# Patient Record
Sex: MALE | Race: WHITE | NOT HISPANIC OR LATINO | Employment: UNEMPLOYED | URBAN - METROPOLITAN AREA
[De-identification: names, ages, dates, MRNs, and addresses within clinical notes are randomized per-mention and may not be internally consistent; named-entity substitution may affect disease eponyms.]

---

## 2017-01-12 DIAGNOSIS — I49.5 SICK SINUS SYNDROME (HCC): ICD-10-CM

## 2017-01-12 DIAGNOSIS — I48.91 ATRIAL FIBRILLATION (HCC): ICD-10-CM

## 2017-01-21 ENCOUNTER — LAB CONVERSION - ENCOUNTER (OUTPATIENT)
Dept: OTHER | Facility: OTHER | Age: 52
End: 2017-01-21

## 2017-01-21 ENCOUNTER — GENERIC CONVERSION - ENCOUNTER (OUTPATIENT)
Dept: OTHER | Facility: OTHER | Age: 52
End: 2017-01-21

## 2017-01-21 LAB
A/G RATIO (HISTORICAL): 1.7 (CALC) (ref 1–2.5)
ALBUMIN SERPL BCP-MCNC: 4.5 G/DL (ref 3.6–5.1)
ALP SERPL-CCNC: 71 U/L (ref 40–115)
ALT SERPL W P-5'-P-CCNC: 26 U/L (ref 9–46)
AST SERPL W P-5'-P-CCNC: 24 U/L (ref 10–35)
BILIRUB SERPL-MCNC: 1.1 MG/DL (ref 0.2–1.2)
BUN SERPL-MCNC: 34 MG/DL (ref 7–25)
BUN/CREA RATIO (HISTORICAL): 19 (CALC) (ref 6–22)
CALCIUM SERPL-MCNC: 9.5 MG/DL (ref 8.6–10.3)
CHLORIDE SERPL-SCNC: 105 MMOL/L (ref 98–110)
CO2 SERPL-SCNC: 23 MMOL/L (ref 20–31)
CREAT SERPL-MCNC: 1.76 MG/DL (ref 0.7–1.33)
DIGOXIN LEVEL (HISTORICAL): 0.6 MCG/L (ref 0.8–2)
EGFR AFRICAN AMERICAN (HISTORICAL): 51 ML/MIN/1.73M2
EGFR-AMERICAN CALC (HISTORICAL): 44 ML/MIN/1.73M2
GAMMA GLOBULIN (HISTORICAL): 2.7 G/DL (CALC) (ref 1.9–3.7)
GLUCOSE (HISTORICAL): 72 MG/DL (ref 65–99)
POTASSIUM SERPL-SCNC: 4.8 MMOL/L (ref 3.5–5.3)
SODIUM SERPL-SCNC: 136 MMOL/L (ref 135–146)
TOTAL PROTEIN (HISTORICAL): 7.2 G/DL (ref 6.1–8.1)

## 2017-03-31 ENCOUNTER — GENERIC CONVERSION - ENCOUNTER (OUTPATIENT)
Dept: OTHER | Facility: OTHER | Age: 52
End: 2017-03-31

## 2017-05-22 ENCOUNTER — ALLSCRIPTS OFFICE VISIT (OUTPATIENT)
Dept: OTHER | Facility: OTHER | Age: 52
End: 2017-05-22

## 2017-05-22 DIAGNOSIS — I77.810 THORACIC AORTIC ECTASIA (HCC): ICD-10-CM

## 2017-05-31 ENCOUNTER — HOSPITAL ENCOUNTER (OUTPATIENT)
Dept: CT IMAGING | Facility: CLINIC | Age: 52
Discharge: HOME | End: 2017-05-31
Payer: MEDICARE

## 2017-05-31 DIAGNOSIS — I77.810 THORACIC AORTIC ECTASIA (HCC): ICD-10-CM

## 2017-05-31 PROCEDURE — 74150 CT ABDOMEN W/O CONTRAST: CPT

## 2017-05-31 PROCEDURE — 71250 CT THORAX DX C-: CPT

## 2017-06-01 ENCOUNTER — GENERIC CONVERSION - ENCOUNTER (OUTPATIENT)
Dept: OTHER | Facility: OTHER | Age: 52
End: 2017-06-01

## 2017-11-24 ENCOUNTER — ALLSCRIPTS OFFICE VISIT (OUTPATIENT)
Dept: OTHER | Facility: OTHER | Age: 52
End: 2017-11-24

## 2017-11-25 NOTE — PROGRESS NOTES
Assessment  Assessed    1  Atrial fibrillation (427 31) (I48 91)   2  Anticoagulant long-term use (V58 61) (Z79 01)   3  Dilation of thoracic aorta (447 71) (I77 810)   4  Sinoatrial node dysfunction (427 81) (I49 5)    Plan  Atrial fibrillation    · Digoxin 125 MCG Oral Tablet; take 1 tablet by mouth once daily   Rx By: Karyn Sanchez; Dispense: 90 Days ; #:90 Tablet; Refill: 3;Atrial fibrillation; ARLEY = N; Verified Transmission to [address]; Last Updated By: System, [employer]; 11/24/2017 10:19:32 AM   · Follow-up visit in 6 months Evaluation and Treatment  Follow-up  Status: Hold For -Scheduling  Requested for: [id number]   Ordered; For: Atrial fibrillation; Ordered By: Karyn Sanchez Performed:  Due: 96OFR2181; Last Updated By: Fay Ruth; 11/24/2017 10:21:21 AM  Hypercholesterolemia    · Simvastatin 20 MG Oral Tablet; take 1 tablet by mouth once daily   Rx By: Karyn Sanchez; Dispense: 30 Days ; #:90 Tablet; Refill: 5;Hypercholesterolemia; ARLEY = N; Verified Transmission to [address]; Last Updated By: System, [employer]; 11/24/2017 10:19:32 AM  Hypertension    · Metoprolol Succinate ER 50 MG Oral Tablet Extended Release 24 Hour; TAKE 11/2 TABLETS TWICE DAILY   Rx By: Karyn Sanchez; Dispense: 90 Days ; #:270 Tablet Extended Release 24 Hour; Refill: 3;Hypertension; ARLEY = N; Verified Transmission to [address]; Last Updated By: System, [employer]Script[employer]; 11/24/2017 10:19:32 AM    Discussion/Summary  Cardiology Discussion Summary Free Text Note Form [facility]:   Patient with multiple medical problems who seems to be doing reasonably well from cardiac standpoint  Previous studies reviewed with patient  Medications reviewed and possible side effects discussed  concepts of cardiovascular disease , signs and symptoms of heart disease  Dietary and risk factor modification reinforced  All questions answered  Safety measures reviewed   Patient advised to report any problems prompting medical attention  understands the risks and benefits of anticoagulation to prevent thromboembolic risk from atrial fibrillation  Patient will continue to follow up with the pacemaker clinic  Importance of salt restriction reinforced  Symptoms to watch out from cardiac standpoint which would indicate the need for further cardiac evaluation also discussed with patient and family  Follow-up in 6 months  Follow-up with primary care physician and Psychiatry  Goals and Barriers: The patient has the current Goals: Report any bleedingpacemaker clinic  The patent has the current Barriers: None  Patient's Capacity to Self-Care: Patient is able to Self-Care  Patient Education: Educational resources provided: See [facility]    Medication SE Review and Pt Understands Tx: Possible side effects of new medications were reviewed with the patient/guardian today  The treatment plan was reviewed with the patient/guardian  The patient/guardian understands and agrees with the treatment plan   Counseling Documentation With Imm: The patient, patient's family was counseled regarding diagnostic results,-- instructions for management,-- risk factor reductions,-- prognosis,-- patient and family education,-- risks and benefits of treatment options,-- importance of compliance with treatment  Chief Complaint  Chief Complaint Free Text Note Form: followup visit   Chief Complaint Chronic Condition [facility]: Patient is here today for follow up of chronic conditions described in HPI  History of Present Illness  Cardiology [facility] Free Text Note Form [facility]: Patient presents for follow-up visit with history of paroxysmal atrial fibrillation on chronic Eliquis, sinoatrial node dysfunction with history of pacemaker, hypertension, history of dilated thoracic aorta  Patient states he is feeling well denies any chest pain, chest pressure, chest heaviness, shortness of breath  He denies any dizziness, syncope, presyncope   He denies any bleeding troubles on Eliquis  Patient states he is taking all medications and does need refills  Review of Systems  Cardiology Male ROS:    Cardiac: No complaints of chest pain, no palpitations, no fainiting -- and-- as noted in HPI  Skin: No complaints of nonhealing sores or skin rash  Genitourinary: No complaints of recurrent urinary tract infections, frequent urination at night, difficult urination, blood in urine, kidney stones, loss of bladder control, no kidney or prostate problems, no erectile dysfunction  Psychological: No complaints of feeling depressed, anxiety, panic attacks, or difficulty concentrating  General: No complaints of trouble sleeping, lack of energy, fatigue, appetite changes, weight changes, fever, frequent infections, or night sweats  Respiratory: No complaints of shortness of breath, cough with sputum, or wheezing  HEENT: No complaints of serious problems, hearing problems, nose problems, throat problems, or snoring  Gastrointestinal: No complaints of liver problems, nausea, vomiting, heartburn, constipation, bloody stools, diarrhea, problems swallowing, adbominal pain, or rectal bleeding  Hematologic: No complaints of bleeding disorders, anemia, blood clots, or excessive brusing  Neurological: No complaints of numbness, tingling, dizziness, weakness, seizures, headaches, syncope or fainting, AM fatigue, daytime sleepiness, no witnessed apnea episodes  Musculoskeletal: No complaints of arthritis, back pain, or painfull swelling  ROS Reviewed:   ROS reviewed  Active Problems  Problems    1  Anticoagulant long-term use (V58 61) (Z79 01)   2  Atrial fibrillation (427 31) (I48 91)   3  Chest discomfort (786 59) (R07 89)   4  Depression (311) (F32 9)   5  Dilation of thoracic aorta (447 71) (I77 810)   6  History of cardiac pacemaker (V12 50,V45 01) (Z95 0)   7  Hypercholesterolemia (272 0) (E78 00)   8  Hypertension (401 9) (I10)   9   Palpitations (785 1) (R00 2)   10  Paroxysmal atrial fibrillation (427 31) (I48 0)   11  Sinoatrial node dysfunction (427 81) (I49 5)    Past Medical History  Problems    1  History of Abnormal electrocardiogram (794 31) (R94 31)   2  History of Acute Myocardial Infarction (V12 59)   3  History of Benign essential hypertension (401 1) (I10)   4  History of Cardiac arrhythmia (427 9) (I49 9)   5  History of Coronary atherosclerosis of native coronary artery (414 01) (I25 10)   6  History of Difficulty breathing (786 09) (R06 89)   7  History of Dissection of aorta (441 00) (I71 00)   8  History of abdominal aortic aneurysm (V12 59) (Z86 79)   9  History of aortic aneurysm (V12 59) (Z86 79)   10  History of atrial fibrillation (V12 59) (Z86 79)   11  History of cardiac pacemaker (V12 50,V45 01) (Z95 0)   12  History of chest pain (V13 89) (Z87 898)   13  History of hypercholesterolemia (V12 29) (Z86 39)   14  History of hyperlipidemia (V12 29) (Z86 39)   15  History of hypertension (V12 59) (Z86 79)   16  History of precordial chest pain (V13 89) (Z87 898)   17  History of shortness of breath (V13 89) (Z87 898)   18  History of sinoatrial node dysfunction (V12 59) (Z86 79)   19  History of Limb pain (729 5) (M79 609)   20  Old myocardial infarction (412) (I25 2)   21  History of Tricuspid valve disorders, non-rheumatic (424 2) (I36 9)  Active Problems And Past Medical History Reviewed: The active problems and past medical history were reviewed and updated today  Surgical History  Problems    1  History of Aortic Dissection Repair   2  History of Arterial Catheterization   3  History of Pacemaker Placement  Surgical History Reviewed: The surgical history was reviewed and updated today  Family History  Family History    1  No pertinent family history  Family History Reviewed: The family history was reviewed and updated today         Social History  Problems    · Denied: History of Alcohol Use (History)   · Never A Smoker  Social History Reviewed: The social history was reviewed and updated today  The social history was reviewed and is unchanged  Current Meds   1  AmLODIPine Besylate 5 MG Oral Tablet; TAKE 1 TABLET DAILY FOR BLOOD PRESSURE  Requested for: 72Qjx5818; Last Rx:44Rjp5691 Ordered   2  Digitek 125 MCG Oral Tablet; take 1 tablet by mouth once daily; Therapy: 86Dzx5035 to (Evaluate:17May2018)  Requested for: 92ZAB6056; Last Rx:22May2017 Ordered   3  Divalproex Sodium 500 MG Oral Tablet Delayed Release; Therapy: 79MYP5255 to (Evaluate:09Sep2014) Recorded   4  Eliquis 5 MG Oral Tablet; Take 1 tablet twice daily; Therapy: 45WRQ0965 to (Evaluate:17May2018)  Requested for: 47JYJ2382; Last Rx:22May2017 Ordered   5  FLUoxetine HCl - 10 MG Oral Tablet; TAKE 1 TABLET DAILY AS DIRECTED; Therapy: 65FFJ1131 to Recorded   6  Fluticasone Propionate 50 MCG/ACT Nasal Suspension; Therapy: 03UAG8829 to (Evaluate:19Sep2014) Recorded   7  HydrOXYzine HCl - 50 MG Oral Tablet; TAKE 1 TABLET AT BEDTIME; Therapy: 34FJC1991 to Recorded   8  Lisinopril 5 MG Oral Tablet; Take 1 tablet daily Recorded   9  Metoprolol Succinate ER 50 MG Oral Tablet Extended Release 24 Hour; TAKE 1 1/2 TABLETS TWICE DAILY; Therapy: 86ZRY8984 to (Evaluate:17May2018)  Requested for: 05WMC2806; Last Rx:22May2017 Ordered   10  PriLOSEC 20 MG CPDR; TAKE 1 CAPSULE DAILY; Therapy: (Recorded:78Lit4402) to Recorded   11  Rozerem 8 MG Oral Tablet; TAKE 1 TABLET AT BEDTIME; Therapy: 59YBZ0738 to Recorded   12  Simvastatin 20 MG Oral Tablet; take 1 tablet by mouth once daily; Therapy: 66XLC7256 to (Evaluate:10May2018)  Requested for: 42UKN8803; Last  Rx:11Nov2017 Ordered   13  Uloric 40 MG Oral Tablet; TAKE 1 TABLET DAILY; Therapy: (Recorded:00Omb7995) to Recorded   14  Ziprasidone HCl - 40 MG Oral Capsule; 1 CAPSULE DAILY; Therapy: 29XZL3113 to Recorded  Medication List Reviewed: The medication list was reviewed and updated today  Allergies  Medication    1  No Known Drug Allergies    Vitals  Vital Signs    Recorded: 54SHY7289 09:56AM   Heart Rate 60   Systolic 159   Diastolic 84   Height 5 ft 10 in   Weight 224 lb 2 0 oz   BMI Calculated 32 16   BSA Calculated 2 19   O2 Saturation 97       Physical Exam   Constitutional  General appearance: No acute distress, well appearing and well nourished  Eyes  Conjunctiva and Sclera examination: Conjunctiva pink, sclera anicteric  Ears, Nose, Mouth, and Throat - Oropharynx: Clear, nares are clear, mucous membranes are moist   Neck  Neck and thyroid: Normal, supple, trachea midline, no thyromegaly  Pulmonary  Respiratory effort: No increased work of breathing or signs of respiratory distress  Auscultation of lungs: Clear to auscultation, no rales, no rhonchi, no wheezing, good air movement  Cardiovascular  Auscultation of heart: Normal rate and rhythm, normal S1 and S2, no murmurs  Carotid pulses: Normal, 2+ bilaterally  Peripheral vascular exam: Normal pulses throughout, no tenderness, erythema or swelling  Pedal pulses: Normal, 2+ bilaterally  Examination of extremities for edema and/or varicosities: Normal    Abdomen  Abdomen: Non-tender and no distention  Liver and spleen: No hepatomegaly or splenomegaly  Musculoskeletal Gait and station: Normal gait  -- Digits and nails: Normal without clubbing or cyanosis  -- Inspection/palpation of joints, bones, and muscles: Normal, ROM normal    Skin - Skin and subcutaneous tissue: Normal without rashes or lesions  Skin is warm and well perfused, normal turgor  Neurologic - Cranial nerves: II - XII intact  -- Speech: Normal    Psychiatric - Orientation to person, place, and time: Normal -- Mood and affect: Normal       Results/Data  Diagnostic Studies Reviewed Cardio: I personally reviewed the recording/images in the office today  My interpretation follows  ICD/ Pacer Interpretation Pacemaker interrogation data reviewed  Normally functioning pacemaker   Normal lead impedance  Battery status is good  No episodes of atrial fibrillation noted  Reviewed with patient and family  Attending Note  Collaborating Physician Note: Collaborating Physician: I interviewed and examined the patient,-- I supervised the Advanced Practitioner-- and-- I agree with the Advanced Practitioner note        Future Appointments    Date/Time Provider Specialty Site   02/23/2018 09:20 AM Cardiology, Pacemaker Clin [facility]   [address]       Signatures   Electronically signed by : Florrie Dancer, [facility]; Nov 24 2017 10:21AM EST                       (Author)    Electronically signed by : FORTINO Mario ; Nov 24 2017  2:53PM EST                       (Author)

## 2018-01-11 NOTE — RESULT NOTES
Verified Results  (1) COMPREHENSIVE METABOLIC PANEL 07XPG6976 54:07GE Candace Good     Test Name Result Flag Reference   GLUCOSE 72 mg/dL  65-99   Fasting reference interval   UREA NITROGEN (BUN) 34 mg/dL H 7-25   CREATININE 1 76 mg/dL H 0 70-1 33   For patients >52years of age, the reference limit  for Creatinine is approximately 13% higher for people  identified as -American  eGFR NON-AFR  AMERICAN 44 mL/min/1 73m2 L > OR = 60   eGFR AFRICAN AMERICAN 51 mL/min/1 73m2 L > OR = 60   BUN/CREATININE RATIO 19 (calc)  6-22   SODIUM 136 mmol/L  135-146   POTASSIUM 4 8 mmol/L  3 5-5 3   CHLORIDE 105 mmol/L     CARBON DIOXIDE 23 mmol/L  20-31   CALCIUM 9 5 mg/dL  8 6-10 3   PROTEIN, TOTAL 7 2 g/dL  6 1-8 1   ALBUMIN 4 5 g/dL  3 6-5 1   GLOBULIN 2 7 g/dL (calc)  1 9-3 7   ALBUMIN/GLOBULIN RATIO 1 7 (calc)  1 0-2 5   BILIRUBIN, TOTAL 1 1 mg/dL  0 2-1 2   ALKALINE PHOSPHATASE 71 U/L     AST 24 U/L  10-35   ALT 26 U/L  9-46     (1) DIGOXIN 28KZO2961 10:24AM Candace Good   REPORT COMMENT:  FASTING:YES     Test Name Result Flag Reference   DIGOXIN 0 6 mcg/L L 0 8-2 0   NEL Anti-Digoxin (Digibind(R)) in serum/plasma of   patients under toxicity therapy may interfere  with the digoxin immunoassay

## 2018-01-12 VITALS
HEART RATE: 60 BPM | BODY MASS INDEX: 32.09 KG/M2 | DIASTOLIC BLOOD PRESSURE: 84 MMHG | OXYGEN SATURATION: 97 % | SYSTOLIC BLOOD PRESSURE: 120 MMHG | HEIGHT: 70 IN | WEIGHT: 224.13 LBS

## 2018-01-14 VITALS
WEIGHT: 224.38 LBS | HEART RATE: 60 BPM | HEIGHT: 70 IN | OXYGEN SATURATION: 97 % | SYSTOLIC BLOOD PRESSURE: 136 MMHG | BODY MASS INDEX: 32.12 KG/M2 | DIASTOLIC BLOOD PRESSURE: 84 MMHG

## 2018-01-17 NOTE — RESULT NOTES
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  COMPARISON: Report of outside CT scan dated 5/5/2017     TECHNIQUE: CT examination of the chest and abdomen was performed without intravenous contrast  Reformatted images were created in axial, sagittal, and coronal planes  Radiation dose length product (DLP) for this visit: 1428 mGy-cm   This examination, like all CT scans performed in the [facility], was performed utilizing techniques to minimize radiation dose exposure, including the use of iterative    reconstruction and automated exposure control  Enteric contrast was not administered  FINDINGS:     CHEST     LUNGS: There is a calcified granuloma in the right lung on image 31  There is some minimal scarring anteriorly in the left upper lobe in the subpleural location  No suspicious lung findings  Airways are clear  PLEURA: No pleural effusion  VESSELS: 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  There is aneurysmal dilatation of the aortic arch with a bulging contour noted along its medial border directed towards the trachea and esophagus  The maximum diameter of the vessel in    this region is 4 1 cm  There is minimal calcification along the surface of this almost saccular type aneurysm  There are surgical clips seen adjacent to the aortic wall just distal to the aneurysm  This presumably indicates a site of prior vascular    repair  The remainder of the aorta is normal in caliber  The lack of IV contrast precludes assessment for dissection   The branch vessels from the arch appear normal in caliber  HEART: Heart is normal size  Dual lead transvenous pacemaker is present  There is no pericardial effusion  MEDIASTINUM AND CHRISTOPHER: No pathologic adenopathy identified  Normal appearing small mediastinal lymph nodes are present  Again, there may be a very small sliding hiatal hernia  BODY WALL AND LOWER NECK: There is subtle gynecomastia  There is no axillary adenopathy  Base of the neck is unremarkable  ABDOMEN     LIVER/BILIARY TREE: Unremarkable  GALLBLADDER: No calcified gallstones  No pericholecystic inflammatory change  SPLEEN: Unremarkable  PANCREAS: Unremarkable  ADRENAL GLANDS: Unremarkable  KIDNEYS/URETERS: There is a cystic-appearing lesion medially at the upper pole of the right kidney which measures 2 8 x 2 6 cm  The remainder of the right kidney is unremarkable  There is a cystic-appearing lesion anteriorly at the midpole of the left   kidney which measures 2 2 cm  There is no hydronephrosis or nephrolithiasis on either side  No perirenal fluid  Visualized ureters are unremarkable  VISUALIZED BOWEL: There is a moderate size duodenal diverticulum adjacent to the pancreatic head  No acute bowel pathology seen within the field-of-view  VISUALIZED ABDOMINAL CAVITY: No ascites or free air  No fluid collection  OSSEOUS STRUCTURES: No acute findings  Deformity of the posterior aspect of the left 4th rib is probably postsurgical        IMPRESSION:     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

## 2018-01-17 NOTE — MISCELLANEOUS
This patient is followed by me at from a cardiac standpoint  Based on his medical conditions and comorbidities, it is in the best interest of this patient not to serve on jury duty  If you have any specific questions,  please do not hesitate to contact me in person  Electronically signed by:Candace PIKE    Mar 31 2017 11:30PM EST

## 2018-02-13 ENCOUNTER — TELEPHONE (OUTPATIENT)
Dept: CARDIOLOGY CLINIC | Facility: CLINIC | Age: 53
End: 2018-02-13

## 2018-03-09 ENCOUNTER — OFFICE VISIT (OUTPATIENT)
Dept: CARDIOLOGY CLINIC | Facility: CLINIC | Age: 53
End: 2018-03-09
Payer: MEDICARE

## 2018-03-09 DIAGNOSIS — I49.5 SINOATRIAL NODE DYSFUNCTION (HCC): ICD-10-CM

## 2018-03-09 DIAGNOSIS — Z95.0 CARDIAC PACEMAKER IN SITU: Primary | ICD-10-CM

## 2018-03-09 PROCEDURE — 93280 PM DEVICE PROGR EVAL DUAL: CPT | Performed by: INTERNAL MEDICINE

## 2018-03-09 NOTE — PROGRESS NOTES
Device in Person    [employer] Shay's device    Normal pacemaker function  Normal lead impedance  Battery status is good    P waves -  5 0  mV  R-waves  - 8 7  mV    Capture thresholds    Atrial lead -    0 62  V@    0 4ms    Ventricular lead -    0 87 V@    0 4 ms    Atrial lead impedance -       440Ohms     ventricular lead impedance -     410 Ohms

## 2018-03-29 RX ORDER — HYDROXYZINE PAMOATE 50 MG/1
CAPSULE ORAL
Refills: 0 | COMMUNITY
Start: 2018-02-25 | End: 2018-04-11

## 2018-03-29 RX ORDER — DIVALPROEX SODIUM 500 MG/1
TABLET, DELAYED RELEASE ORAL
COMMUNITY
End: 2022-02-16 | Stop reason: ALTCHOICE

## 2018-03-29 RX ORDER — FEBUXOSTAT 40 MG/1
40 TABLET ORAL DAILY
Refills: 0 | COMMUNITY
Start: 2018-01-22

## 2018-03-29 RX ORDER — HYDROXYZINE 50 MG/1
1 TABLET, FILM COATED ORAL
COMMUNITY

## 2018-03-29 RX ORDER — ZIPRASIDONE HYDROCHLORIDE 40 MG/1
40 CAPSULE ORAL
Refills: 0 | COMMUNITY
Start: 2018-02-28

## 2018-03-29 RX ORDER — FLUTICASONE PROPIONATE 50 MCG
SPRAY, SUSPENSION (ML) NASAL
COMMUNITY
Start: 2014-03-28 | End: 2022-02-16 | Stop reason: ALTCHOICE

## 2018-03-29 RX ORDER — OMEPRAZOLE 20 MG/1
20 CAPSULE, DELAYED RELEASE ORAL DAILY
Refills: 0 | COMMUNITY
Start: 2018-02-24

## 2018-03-29 RX ORDER — DIGOXIN 125 MCG
1 TABLET ORAL
COMMUNITY
Start: 2017-11-24 | End: 2018-04-11 | Stop reason: SDUPTHER

## 2018-03-29 RX ORDER — OMEPRAZOLE 20 MG/1
1 CAPSULE, DELAYED RELEASE ORAL DAILY
COMMUNITY
End: 2018-04-11

## 2018-03-29 RX ORDER — FLUOXETINE 10 MG/1
10 CAPSULE ORAL DAILY
Refills: 0 | COMMUNITY
Start: 2018-02-27

## 2018-03-29 RX ORDER — AMLODIPINE BESYLATE 5 MG/1
1 TABLET ORAL
COMMUNITY
End: 2018-04-11 | Stop reason: SDUPTHER

## 2018-03-29 RX ORDER — RAMELTEON 8 MG
8 TABLET ORAL
Refills: 0 | COMMUNITY
Start: 2018-03-01

## 2018-03-29 RX ORDER — LISINOPRIL 5 MG/1
1 TABLET ORAL
COMMUNITY
Start: 2017-11-10 | End: 2018-04-11 | Stop reason: SDUPTHER

## 2018-03-29 RX ORDER — SIMVASTATIN 20 MG
TABLET ORAL
Refills: 0 | COMMUNITY
Start: 2018-03-12 | End: 2018-04-11 | Stop reason: SDUPTHER

## 2018-03-29 RX ORDER — APIXABAN 5 MG/1
5 TABLET, FILM COATED ORAL 2 TIMES DAILY
Refills: 0 | COMMUNITY
Start: 2018-02-26 | End: 2018-04-11 | Stop reason: SDUPTHER

## 2018-03-29 RX ORDER — METOPROLOL SUCCINATE 50 MG/1
1.5 TABLET, EXTENDED RELEASE ORAL
COMMUNITY
Start: 2017-11-24 | End: 2018-04-11 | Stop reason: SDUPTHER

## 2018-04-11 ENCOUNTER — OFFICE VISIT (OUTPATIENT)
Dept: CARDIOLOGY CLINIC | Facility: CLINIC | Age: 53
End: 2018-04-11
Payer: MEDICARE

## 2018-04-11 VITALS
HEIGHT: 70 IN | DIASTOLIC BLOOD PRESSURE: 88 MMHG | WEIGHT: 221 LBS | HEART RATE: 60 BPM | BODY MASS INDEX: 31.64 KG/M2 | SYSTOLIC BLOOD PRESSURE: 138 MMHG | OXYGEN SATURATION: 98 %

## 2018-04-11 DIAGNOSIS — I48.0 PAROXYSMAL A-FIB (HCC): ICD-10-CM

## 2018-04-11 DIAGNOSIS — R06.00 DYSPNEA ON EFFORT: ICD-10-CM

## 2018-04-11 DIAGNOSIS — Z79.01 CHRONIC ANTICOAGULATION: ICD-10-CM

## 2018-04-11 DIAGNOSIS — E78.2 COMBINED HYPERLIPIDEMIA: ICD-10-CM

## 2018-04-11 DIAGNOSIS — I10 HYPERTENSION, ESSENTIAL: ICD-10-CM

## 2018-04-11 DIAGNOSIS — I10 HYPERTENSION, ESSENTIAL: Primary | ICD-10-CM

## 2018-04-11 DIAGNOSIS — I48.0 PAROXYSMAL ATRIAL FIBRILLATION (HCC): Primary | ICD-10-CM

## 2018-04-11 DIAGNOSIS — R07.89 CHEST DISCOMFORT: ICD-10-CM

## 2018-04-11 PROBLEM — R06.09 DYSPNEA ON EFFORT: Status: ACTIVE | Noted: 2018-04-11

## 2018-04-11 PROCEDURE — 99214 OFFICE O/P EST MOD 30 MIN: CPT | Performed by: INTERNAL MEDICINE

## 2018-04-11 RX ORDER — DIGOXIN 125 MCG
125 TABLET ORAL DAILY
Qty: 90 TABLET | Refills: 3 | Status: SHIPPED | OUTPATIENT
Start: 2018-04-11 | End: 2018-10-31 | Stop reason: SDUPTHER

## 2018-04-11 RX ORDER — LISINOPRIL 5 MG/1
5 TABLET ORAL DAILY
Qty: 90 TABLET | Refills: 3 | Status: SHIPPED | OUTPATIENT
Start: 2018-04-11 | End: 2018-10-31 | Stop reason: SDUPTHER

## 2018-04-11 RX ORDER — SIMVASTATIN 20 MG
20 TABLET ORAL
Qty: 90 TABLET | Refills: 3 | Status: SHIPPED | OUTPATIENT
Start: 2018-04-11 | End: 2018-10-31 | Stop reason: SDUPTHER

## 2018-04-11 RX ORDER — AMLODIPINE BESYLATE 5 MG/1
5 TABLET ORAL DAILY
Qty: 90 TABLET | Refills: 3 | Status: SHIPPED | OUTPATIENT
Start: 2018-04-11 | End: 2018-10-31 | Stop reason: SDUPTHER

## 2018-04-11 RX ORDER — METOPROLOL SUCCINATE 50 MG/1
75 TABLET, EXTENDED RELEASE ORAL DAILY
Qty: 135 TABLET | Refills: 3 | Status: SHIPPED | OUTPATIENT
Start: 2018-04-11 | End: 2018-10-31 | Stop reason: SDUPTHER

## 2018-04-11 RX ORDER — APIXABAN 5 MG/1
5 TABLET, FILM COATED ORAL 2 TIMES DAILY
Qty: 180 TABLET | Refills: 3 | Status: SHIPPED | OUTPATIENT
Start: 2018-04-11 | End: 2018-10-31 | Stop reason: SDUPTHER

## 2018-04-11 NOTE — PROGRESS NOTES
[facility] [place] [facility]  [address] [place] [address] [address] [id number]  Cardiology Follow Up    Nicole Mariella Learn  1965  [id number]      1  Paroxysmal atrial fibrillation (HCC)     2  Hypertension, essential     3  Chronic anticoagulation     4  Chest discomfort  NM myocardial perfusion spect (stress and/or rest)   5  Dyspnea on effort  NM myocardial perfusion spect (stress and/or rest)       Chief Complaint   Patient presents with    Follow-up     afib       Interval History: For follow-up visit  Patient has been having intermittent episodes of chest discomfort some of them related to activity   Patient does not have any history of documented coronary artery disease or MI in the past   Patient does have history of hypertension hyperlipidemia history of surgery for aortic dissection many years ago  Patient also has history of sick sinus syndrome status post pacemaker  Patient also has history of paroxysmal atrial fibrillation and is on anticoagulation  Patient does have some shortness of breath with exertion  No recent cardiac workup  Patient Active Problem List   Diagnosis    Paroxysmal atrial fibrillation (HCC)    Hypertension, essential    Chronic anticoagulation    Chest discomfort    Dyspnea on effort     Past Medical History:   Diagnosis Date    Abdominal aortic aneurysm (AAA) (HCC)     Abnormal ECG     Atrial fibrillation (HCC)     Cardiac arrhythmia     Chest pain     Coronary atherosclerosis of native coronary artery     Hyperlipidemia     Hypertension     Myocardial infarction ([address] )     Sinoatrial node dysfunction (HCC)     SOB (shortness of breath)      Social History     Social History    Marital status: Single     Spouse name: N/A    Number of children: N/A    Years of education: N/A     Occupational History    Not on file       Social History Main Topics    Smoking status: Never Smoker    Smokeless tobacco: Never Used    Alcohol use No    Drug use: Unknown  Sexual activity: Not on file     Other Topics Concern    Not on file     Social History Narrative    No narrative on file      No family history on file  Past Surgical History:   Procedure Laterality Date    CARDIAC CATHETERIZATION      INSERT / REPLACE / REMOVE PACEMAKER      PELVIC AND PARA-AORTIC LYMPH NODE DISSECTION         Current Outpatient Prescriptions:     Cyclobenzaprine HCl (FLEXERIL PO), 1 tablet at bedtime, Disp: , Rfl:     FLUoxetine (PROzac) 10 mg capsule, , Disp: , Rfl: 0    fluticasone (FLONASE) 50 mcg/act nasal spray, into each nostril, Disp: , Rfl:     hydrOXYzine HCL (ATARAX) 50 mg tablet, Take 1 tablet by mouth, Disp: , Rfl:     omeprazole (PriLOSEC) 20 mg delayed release capsule, Take 20 mg by mouth daily, Disp: , Rfl: 0    ROZEREM 8 MG tablet, , Disp: , Rfl: 0    ULORIC 40 MG tablet, Take 40 mg by mouth daily, Disp: , Rfl: 0    ziprasidone (GEODON) 40 mg capsule, , Disp: , Rfl: 0    amLODIPine (NORVASC) 5 mg tablet, Take 1 tablet (5 mg total) by mouth daily, Disp: 90 tablet, Rfl: 3    digoxin (DIGITEK) 0 125 mg tablet, Take 1 tablet (125 mcg total) by mouth daily, Disp: 90 tablet, Rfl: 3    divalproex sodium (DEPAKOTE) 500 mg EC tablet, 1 TABLET DAILY, Disp: , Rfl:     ELIQUIS 5 MG, Take 1 tablet (5 mg total) by mouth 2 (two) times a day, Disp: 180 tablet, Rfl: 3    lisinopril (ZESTRIL) 5 mg tablet, Take 1 tablet (5 mg total) by mouth daily, Disp: 90 tablet, Rfl: 3    metoprolol succinate (TOPROL-XL) 50 mg 24 hr tablet, Take 1 5 tablets (75 mg total) by mouth daily, Disp: 135 tablet, Rfl: 3    simvastatin (ZOCOR) 20 mg tablet, Take 1 tablet (20 mg total) by mouth daily at bedtime, Disp: 90 tablet, Rfl: 3  No Known Allergies    Labs:  No visits with results within 2 Month(s) from this visit     Latest known visit with results is:   Lab Conversion - Encounter on 01/21/2017   Component Date Value    Glucose 01/20/2017 72     BUN 01/20/2017 34*    Creatinine 01/20/2017 1 76*    EGFR-AMERICAN CALC 01/20/2017 44*    eGFR  01/20/2017 51*    BUN/CREA Ratio 01/20/2017 19     Sodium 01/20/2017 136     Potassium 01/20/2017 4 8     Chloride 01/20/2017 105     CO2 01/20/2017 23     Calcium 01/20/2017 9 5     Total Protein 01/20/2017 7 2     Albumin 01/20/2017 4 5     GAMMA GLOBULIN 01/20/2017 2 7     A/G RATIO 01/20/2017 1 7     Total Bilirubin 01/20/2017 1 1     Alkaline Phosphatase 01/20/2017 71     AST 01/20/2017 24     ALT 01/20/2017 26     DIGOXIN LEVEL 01/20/2017 0 6*     Imaging: No results found  Review of Systems:  Review of Systems   REVIEW OF SYSTEMS:  Constitutional:  Denies fever or chills   Eyes:  Denies change in visual acuity   HENT:  Denies nasal congestion or sore throat   Respiratory:  shortness of breath   Cardiovascular:  Chest pain   GI:  Denies abdominal pain, nausea, vomiting, bloody stools or diarrhea   :  Denies dysuria, frequency, difficulty in micturition and nocturia  Musculoskeletal:  Denies back pain or joint pain   Neurologic:  Denies headache, focal weakness or sensory changes   Endocrine:  Denies polyuria or polydipsia   Lymphatic:  Denies swollen glands   Psychiatric:  Denies depression or anxiety     Physical Exam:    /88   Pulse 60   Ht 5' 10" (1 778 m)   Wt 100 kg (221 lb)   SpO2 98%   BMI 31 71 kg/m²     Physical Exam   PHYSICAL EXAM:  General:  Patient is not in acute distress   Head: Normocephalic, Atraumatic  HEENT:  Both pupils normal-size atraumatic, normocephalic, nonicteric  Neck:  JVP not raised  Trachea central  No carotid bruit  Respiratory:  normal breath sounds no crackles  no rhonchi  Cardiovascular:  Regular rate and rhythm no S3 no murmurs  GI:  Abdomen soft nontender  No organomegaly  Lymphatic:  No cervical or inguinal lymphadenopathy  Neurologic:  Patient is awake alert, oriented    Grossly nonfocal    Discussion/Summary:  Patient with the symptoms of chest pain and shortness of breath of unclear etiology  Patient will be scheduled for nuclear stress test exercise to assess for exercise capacity as well as ischemia  Symptoms to watch out from cardiac standpoint which would indicate the need for further cardiac evaluation also discussed with the patient  Patient understands the risks and benefits of anticoagulation to prevent thromboembolic risk from atrial fibrillation  Patient report any bleeding issues  Prescriptions were reviewed  Patient and family had a few questions which were answered  Patient will continue to follow up with pacemaker clinic  Results of pacemaker interrogation data were reviewed  Patient does have brief episodes of atrial fibrillation but patient is asymptomatic  Patient is maintained on rate control and anticoagulation  Follow-up in 6 months or earlier as needed  Follow-up with primary care physician

## 2018-04-25 ENCOUNTER — HOSPITAL ENCOUNTER (OUTPATIENT)
Dept: NON INVASIVE DIAGNOSTICS | Facility: CLINIC | Age: 53
Discharge: HOME | End: 2018-04-25
Payer: MEDICARE

## 2018-04-25 DIAGNOSIS — R07.89 CHEST DISCOMFORT: ICD-10-CM

## 2018-04-25 DIAGNOSIS — R06.00 DYSPNEA ON EFFORT: ICD-10-CM

## 2018-04-25 PROCEDURE — 78452 HT MUSCLE IMAGE SPECT MULT: CPT

## 2018-04-25 PROCEDURE — 93017 CV STRESS TEST TRACING ONLY: CPT

## 2018-04-25 PROCEDURE — A9502 TC99M TETROFOSMIN: HCPCS

## 2018-04-27 LAB
MAX DIASTOLIC BP: 102 MMHG
MAX HEART RATE: 85 BPM
MAX PREDICTED HEART RATE: 168 BPM
MAX. SYSTOLIC BP: 156 MMHG
PROTOCOL NAME: NORMAL
REASON FOR TERMINATION: NORMAL
TARGET HR FORMULA: NORMAL
TIME IN EXERCISE PHASE: NORMAL

## 2018-05-18 ENCOUNTER — TELEPHONE (OUTPATIENT)
Dept: CARDIOLOGY CLINIC | Facility: CLINIC | Age: 53
End: 2018-05-18

## 2018-05-18 NOTE — TELEPHONE ENCOUNTER
Spoke with pts wife to let her know we do have eliquis 5mg samples ready in the [place] office, she said she'll pick them up Monday-MS

## 2018-09-14 ENCOUNTER — IN-CLINIC DEVICE VISIT (OUTPATIENT)
Dept: CARDIOLOGY CLINIC | Facility: CLINIC | Age: 53
End: 2018-09-14
Payer: MEDICARE

## 2018-09-14 DIAGNOSIS — Z95.0 PACEMAKER: ICD-10-CM

## 2018-09-14 DIAGNOSIS — I48.0 PAROXYSMAL ATRIAL FIBRILLATION (HCC): ICD-10-CM

## 2018-09-14 PROCEDURE — 93280 PM DEVICE PROGR EVAL DUAL: CPT | Performed by: INTERNAL MEDICINE

## 2018-10-31 ENCOUNTER — OFFICE VISIT (OUTPATIENT)
Dept: CARDIOLOGY CLINIC | Facility: CLINIC | Age: 53
End: 2018-10-31
Payer: MEDICARE

## 2018-10-31 VITALS
DIASTOLIC BLOOD PRESSURE: 82 MMHG | HEART RATE: 63 BPM | BODY MASS INDEX: 31.07 KG/M2 | SYSTOLIC BLOOD PRESSURE: 120 MMHG | HEIGHT: 70 IN | OXYGEN SATURATION: 99 % | WEIGHT: 217 LBS

## 2018-10-31 DIAGNOSIS — I10 HYPERTENSION, ESSENTIAL: ICD-10-CM

## 2018-10-31 DIAGNOSIS — I48.0 PAROXYSMAL ATRIAL FIBRILLATION (HCC): Primary | ICD-10-CM

## 2018-10-31 DIAGNOSIS — E78.2 COMBINED HYPERLIPIDEMIA: ICD-10-CM

## 2018-10-31 DIAGNOSIS — Z79.01 CHRONIC ANTICOAGULATION: ICD-10-CM

## 2018-10-31 DIAGNOSIS — I48.0 PAROXYSMAL A-FIB (HCC): ICD-10-CM

## 2018-10-31 DIAGNOSIS — I49.5 SINOATRIAL NODE DYSFUNCTION (HCC): ICD-10-CM

## 2018-10-31 PROBLEM — R07.89 CHEST DISCOMFORT: Status: RESOLVED | Noted: 2018-04-11 | Resolved: 2018-10-31

## 2018-10-31 PROCEDURE — 99214 OFFICE O/P EST MOD 30 MIN: CPT | Performed by: INTERNAL MEDICINE

## 2018-10-31 RX ORDER — DIGOXIN 125 MCG
125 TABLET ORAL DAILY
Qty: 90 TABLET | Refills: 3 | Status: SHIPPED | OUTPATIENT
Start: 2018-10-31 | End: 2019-04-22 | Stop reason: SDUPTHER

## 2018-10-31 RX ORDER — METOPROLOL SUCCINATE 50 MG/1
75 TABLET, EXTENDED RELEASE ORAL DAILY
Qty: 135 TABLET | Refills: 3 | Status: SHIPPED | OUTPATIENT
Start: 2018-10-31 | End: 2019-04-22 | Stop reason: SDUPTHER

## 2018-10-31 RX ORDER — LISINOPRIL 5 MG/1
5 TABLET ORAL DAILY
Qty: 90 TABLET | Refills: 3 | Status: SHIPPED | OUTPATIENT
Start: 2018-10-31 | End: 2020-05-05 | Stop reason: SDUPTHER

## 2018-10-31 RX ORDER — AMLODIPINE BESYLATE 5 MG/1
5 TABLET ORAL DAILY
Qty: 90 TABLET | Refills: 3 | Status: SHIPPED | OUTPATIENT
Start: 2018-10-31 | End: 2019-04-22 | Stop reason: SDUPTHER

## 2018-10-31 RX ORDER — APIXABAN 5 MG/1
5 TABLET, FILM COATED ORAL 2 TIMES DAILY
Qty: 180 TABLET | Refills: 3 | Status: SHIPPED | OUTPATIENT
Start: 2018-10-31 | End: 2019-04-22 | Stop reason: SDUPTHER

## 2018-10-31 RX ORDER — SIMVASTATIN 20 MG
20 TABLET ORAL
Qty: 90 TABLET | Refills: 3 | Status: SHIPPED | OUTPATIENT
Start: 2018-10-31 | End: 2019-04-22 | Stop reason: SDUPTHER

## 2018-10-31 NOTE — PROGRESS NOTES
[facility] [place] [facility]  [address] [facility]  [id number]  Cardiology Follow Up    Sabino Herrera  1965  [id number]      1  Paroxysmal atrial fibrillation (HCC)     2  Hypertension, essential     3  Chronic anticoagulation     4  Sinoatrial node dysfunction [facility])         Chief Complaint   Patient presents with    Follow-up       Interval History:   Patient presents for follow-up visit  Patient denies any history of chest pain  Patient does have episodes of occasional palpitations  He states that he has been compliant with all his present medications  He is also on anticoagulation for paroxysmal atrial fibrillation  Patient does have a pacemaker which is been followed in the pacemaker clinic  Patient Active Problem List   Diagnosis    Paroxysmal atrial fibrillation (HCC)    Hypertension, essential    Chronic anticoagulation    Dyspnea on effort     Past Medical History:   Diagnosis Date    Abdominal aortic aneurysm (AAA) (HCC)     Abnormal ECG     Atrial fibrillation (HCC)     Cardiac arrhythmia     Chest pain     Coronary atherosclerosis of native coronary artery     Hyperlipidemia     Hypertension     Myocardial infarction ([address] )     Sinoatrial node dysfunction (HCC)     SOB (shortness of breath)      Social History     Social History    Marital status: Single     Spouse name: N/A    Number of children: N/A    Years of education: N/A     Occupational History    Not on file  Social History Main Topics    Smoking status: Never Smoker    Smokeless tobacco: Never Used    Alcohol use No    Drug use: Unknown    Sexual activity: Not on file     Other Topics Concern    Not on file     Social History Narrative    No narrative on file      History reviewed  No pertinent family history    Past Surgical History:   Procedure Laterality Date    CARDIAC CATHETERIZATION      INSERT / REPLACE / REMOVE PACEMAKER      PELVIC AND PARA-AORTIC LYMPH NODE DISSECTION Current Outpatient Prescriptions:     amLODIPine (NORVASC) 5 mg tablet, Take 1 tablet (5 mg total) by mouth daily, Disp: 90 tablet, Rfl: 3    Cyclobenzaprine HCl (FLEXERIL PO), 1 tablet at bedtime, Disp: , Rfl:     digoxin (DIGITEK) 0 125 mg tablet, Take 1 tablet (125 mcg total) by mouth daily, Disp: 90 tablet, Rfl: 3    divalproex sodium (DEPAKOTE) 500 mg EC tablet, 1 TABLET DAILY, Disp: , Rfl:     ELIQUIS 5 MG, Take 1 tablet (5 mg total) by mouth 2 (two) times a day, Disp: 180 tablet, Rfl: 3    FLUoxetine (PROzac) 10 mg capsule, , Disp: , Rfl: 0    fluticasone (FLONASE) 50 mcg/act nasal spray, into each nostril, Disp: , Rfl:     hydrOXYzine HCL (ATARAX) 50 mg tablet, Take 1 tablet by mouth, Disp: , Rfl:     lisinopril (ZESTRIL) 5 mg tablet, Take 1 tablet (5 mg total) by mouth daily, Disp: 90 tablet, Rfl: 3    metoprolol succinate (TOPROL-XL) 50 mg 24 hr tablet, Take 1 5 tablets (75 mg total) by mouth daily, Disp: 135 tablet, Rfl: 3    omeprazole (PriLOSEC) 20 mg delayed release capsule, Take 20 mg by mouth daily, Disp: , Rfl: 0    ROZEREM 8 MG tablet, , Disp: , Rfl: 0    simvastatin (ZOCOR) 20 mg tablet, Take 1 tablet (20 mg total) by mouth daily at bedtime, Disp: 90 tablet, Rfl: 3    ULORIC 40 MG tablet, Take 40 mg by mouth daily, Disp: , Rfl: 0    ziprasidone (GEODON) 40 mg capsule, , Disp: , Rfl: 0  No Known Allergies    Labs:  No visits with results within 2 Month(s) from this visit  Latest known visit with results is:   Hospital Outpatient Visit on 04/25/2018   Component Date Value    Protocol Name 04/25/2018 Claireroland Marquita Time In Exercise Phase 04/25/2018 00:07:00     MAX   SYSTOLIC BP 73/96/6391 842     Max Diastolic Bp 61/08/8411 188     Max Heart Rate 04/25/2018 85     Max Predicted Heart Rate 04/25/2018 168     Reason for Termination 04/25/2018 Maximal exercise (symptom limited)     Test Indication 04/25/2018                      Value:Chest Discomfort  Dyspnea on effort      Target Hr Formular 04/25/2018 (220 - Age)*85%      Imaging: No results found  Review of Systems:  Review of Systems   REVIEW OF SYSTEMS:  Constitutional:  Denies fever or chills   Eyes:  Denies change in visual acuity   HENT:  Denies nasal congestion or sore throat   Respiratory:  Denies cough or shortness of breath   Cardiovascular:  Denies chest pain or edema   GI:  Denies abdominal pain, nausea, vomiting, bloody stools or diarrhea   :  Denies dysuria, frequency, difficulty in micturition and nocturia  Musculoskeletal:  Denies back pain or joint pain   Neurologic:  Denies headache, focal weakness or sensory changes   Endocrine:  Denies polyuria or polydipsia   Lymphatic:  Denies swollen glands   Psychiatric:  Denies depression or anxiety     Physical Exam:    /82   Pulse 63   Ht 5' 10" (1 778 m)   Wt 98 4 kg (217 lb)   SpO2 99%   BMI 31 14 kg/m²     Physical Exam  PHYSICAL EXAM:  General:  Patient is not in acute distress   Head: Normocephalic, Atraumatic  HEENT:  Both pupils normal-size atraumatic, normocephalic, nonicteric  Neck:  JVP not raised  Trachea central  No carotid bruit  Respiratory:  normal breath sounds no crackles  no rhonchi  Cardiovascular:  Regular rate and rhythm no S3 no murmurs  GI:  Abdomen soft nontender  No organomegaly  Lymphatic:  No cervical or inguinal lymphadenopathy  Neurologic:  Patient is awake alert, oriented   Grossly nonfocal      Discussion/Summary:   Patient with multiple medical problems who seems to be doing reasonably well from cardiac standpoint  Previous studies reviewed with patient  Medications reviewed and possible side effects discussed  concepts of cardiovascular disease , signs and symptoms of heart disease  Dietary and risk factor modification reinforced  All questions answered  Safety measures reviewed  Patient advised to report any problems prompting medical attention    Patient will be scheduled for an echocardiogram to assess ejection fraction valves and look for evidence of pulmonary hypertension  Patient understands the risks and benefits of anticoagulation to prevent thromboembolic risk from atrial fibrillation  Patient will continue to follow up with pacemaker clinic  Patient report any bleeding issues  Patient and family had a few questions which were answered  Medications refilled  Follow-up in 6 months  Follow-up with primary care physician

## 2018-11-21 ENCOUNTER — HOSPITAL ENCOUNTER (OUTPATIENT)
Dept: NON INVASIVE DIAGNOSTICS | Facility: CLINIC | Age: 53
Discharge: HOME | End: 2018-11-21
Payer: MEDICARE

## 2018-11-21 DIAGNOSIS — I48.0 PAROXYSMAL ATRIAL FIBRILLATION (HCC): ICD-10-CM

## 2018-11-21 PROCEDURE — 93306 TTE W/DOPPLER COMPLETE: CPT | Performed by: INTERNAL MEDICINE

## 2018-11-21 PROCEDURE — 93306 TTE W/DOPPLER COMPLETE: CPT

## 2019-02-18 ENCOUNTER — TELEPHONE (OUTPATIENT)
Dept: CARDIOLOGY CLINIC | Facility: CLINIC | Age: 54
End: 2019-02-18

## 2019-03-08 ENCOUNTER — IN-CLINIC DEVICE VISIT (OUTPATIENT)
Dept: CARDIOLOGY CLINIC | Facility: CLINIC | Age: 54
End: 2019-03-08
Payer: MEDICARE

## 2019-03-08 DIAGNOSIS — Z95.0 PRESENCE OF PERMANENT CARDIAC PACEMAKER: ICD-10-CM

## 2019-03-08 DIAGNOSIS — I48.0 PAROXYSMAL ATRIAL FIBRILLATION (HCC): Primary | ICD-10-CM

## 2019-03-08 DIAGNOSIS — I49.5 SSS (SICK SINUS SYNDROME) (HCC): ICD-10-CM

## 2019-03-08 PROCEDURE — 93280 PM DEVICE PROGR EVAL DUAL: CPT | Performed by: INTERNAL MEDICINE

## 2019-03-08 NOTE — PROGRESS NOTES
SJM DUAL CHAMBER PM  DEVICE INTERROGATED IN THE [place] OFFICE:  BATTERY VOLTAGE ADEQUATE (3 2 YR)   AP 90%  <1%    ALL LEAD PARAMETERS WITHIN NORMAL LIMITS   NO SIGNIFICANT HIGH RATE EPISODES   NO PROGRAMMING CHANGES MADE TO DEVICE PARAMETERS   NORMAL DEVICE FUNCTION   RG

## 2019-04-22 ENCOUNTER — OFFICE VISIT (OUTPATIENT)
Dept: CARDIOLOGY CLINIC | Facility: CLINIC | Age: 54
End: 2019-04-22
Payer: MEDICARE

## 2019-04-22 VITALS
HEART RATE: 60 BPM | BODY MASS INDEX: 32.78 KG/M2 | OXYGEN SATURATION: 97 % | HEIGHT: 70 IN | WEIGHT: 229 LBS | DIASTOLIC BLOOD PRESSURE: 86 MMHG | SYSTOLIC BLOOD PRESSURE: 140 MMHG

## 2019-04-22 DIAGNOSIS — E78.2 COMBINED HYPERLIPIDEMIA: ICD-10-CM

## 2019-04-22 DIAGNOSIS — Z79.01 CHRONIC ANTICOAGULATION: ICD-10-CM

## 2019-04-22 DIAGNOSIS — I10 HYPERTENSION, ESSENTIAL: ICD-10-CM

## 2019-04-22 DIAGNOSIS — I48.0 PAROXYSMAL A-FIB (HCC): ICD-10-CM

## 2019-04-22 DIAGNOSIS — I71.2 THORACIC AORTIC ANEURYSM WITHOUT RUPTURE (HCC): ICD-10-CM

## 2019-04-22 DIAGNOSIS — I48.0 PAROXYSMAL ATRIAL FIBRILLATION (HCC): Primary | ICD-10-CM

## 2019-04-22 PROCEDURE — 99214 OFFICE O/P EST MOD 30 MIN: CPT | Performed by: INTERNAL MEDICINE

## 2019-04-22 RX ORDER — SIMVASTATIN 20 MG
20 TABLET ORAL
Qty: 90 TABLET | Refills: 3 | Status: SHIPPED | OUTPATIENT
Start: 2019-04-22 | End: 2019-10-24 | Stop reason: SDUPTHER

## 2019-04-22 RX ORDER — DIGOXIN 125 MCG
125 TABLET ORAL DAILY
Qty: 90 TABLET | Refills: 3 | Status: SHIPPED | OUTPATIENT
Start: 2019-04-22 | End: 2019-10-24 | Stop reason: SDUPTHER

## 2019-04-22 RX ORDER — METOPROLOL SUCCINATE 50 MG/1
75 TABLET, EXTENDED RELEASE ORAL DAILY
Qty: 135 TABLET | Refills: 3 | Status: SHIPPED | OUTPATIENT
Start: 2019-04-22 | End: 2019-10-24 | Stop reason: SDUPTHER

## 2019-04-22 RX ORDER — AMLODIPINE BESYLATE 5 MG/1
5 TABLET ORAL DAILY
Qty: 90 TABLET | Refills: 3 | Status: SHIPPED | OUTPATIENT
Start: 2019-04-22 | End: 2020-05-05 | Stop reason: SDUPTHER

## 2019-04-22 RX ORDER — APIXABAN 5 MG/1
5 TABLET, FILM COATED ORAL 2 TIMES DAILY
Qty: 180 TABLET | Refills: 3 | Status: SHIPPED | OUTPATIENT
Start: 2019-04-22 | End: 2019-10-24 | Stop reason: SDUPTHER

## 2019-05-02 ENCOUNTER — HOSPITAL ENCOUNTER (OUTPATIENT)
Dept: CT IMAGING | Facility: CLINIC | Age: 54
Discharge: HOME | End: 2019-05-02
Payer: MEDICARE

## 2019-05-02 DIAGNOSIS — I71.2 THORACIC AORTIC ANEURYSM WITHOUT RUPTURE (HCC): ICD-10-CM

## 2019-05-02 PROCEDURE — 71250 CT THORAX DX C-: CPT

## 2019-05-02 PROCEDURE — 74150 CT ABDOMEN W/O CONTRAST: CPT

## 2019-06-12 ENCOUNTER — REMOTE DEVICE CLINIC VISIT (OUTPATIENT)
Dept: CARDIOLOGY CLINIC | Facility: CLINIC | Age: 54
End: 2019-06-12
Payer: MEDICARE

## 2019-06-12 DIAGNOSIS — I48.0 PAROXYSMAL ATRIAL FIBRILLATION (HCC): Primary | ICD-10-CM

## 2019-06-12 DIAGNOSIS — I49.5 SSS (SICK SINUS SYNDROME) (HCC): ICD-10-CM

## 2019-06-12 DIAGNOSIS — Z95.0 PRESENCE OF PERMANENT CARDIAC PACEMAKER: ICD-10-CM

## 2019-06-12 PROCEDURE — 93296 REM INTERROG EVL PM/IDS: CPT | Performed by: INTERNAL MEDICINE

## 2019-06-12 PROCEDURE — 93294 REM INTERROG EVL PM/LDLS PM: CPT | Performed by: INTERNAL MEDICINE

## 2019-09-11 ENCOUNTER — REMOTE DEVICE CLINIC VISIT (OUTPATIENT)
Dept: CARDIOLOGY CLINIC | Facility: CLINIC | Age: 54
End: 2019-09-11
Payer: MEDICARE

## 2019-09-11 DIAGNOSIS — Z95.0 CARDIAC PACEMAKER IN SITU: Primary | ICD-10-CM

## 2019-09-11 PROCEDURE — 93296 REM INTERROG EVL PM/IDS: CPT | Performed by: INTERNAL MEDICINE

## 2019-09-11 PROCEDURE — 93294 REM INTERROG EVL PM/LDLS PM: CPT | Performed by: INTERNAL MEDICINE

## 2019-09-11 NOTE — PROGRESS NOTES
Results for orders placed or performed in visit on 09/11/19   Cardiac EP device report    Narrative    SJM DUAL CHAMBER PM  MERLIN TRANSMISSION: BATTERY STATUS "OK"  AP 90%  0%  ALL AVAILABLE LEAD PARAMETERS WITHIN NORMAL LIMITS  1 AMS NOTED, 0% BURDEN  PT ON ELIQUIS & METO SUCC  AVAIL EGRAM PRESENTS AS AFIB  NORMAL DEVICE FUNCTION   NC

## 2019-10-24 DIAGNOSIS — I10 HYPERTENSION, ESSENTIAL: ICD-10-CM

## 2019-10-24 DIAGNOSIS — I48.0 PAROXYSMAL A-FIB (HCC): ICD-10-CM

## 2019-10-24 DIAGNOSIS — E78.2 COMBINED HYPERLIPIDEMIA: ICD-10-CM

## 2019-10-25 RX ORDER — APIXABAN 5 MG/1
5 TABLET, FILM COATED ORAL 2 TIMES DAILY
Qty: 180 TABLET | Refills: 3 | Status: SHIPPED | OUTPATIENT
Start: 2019-10-25 | End: 2020-05-05 | Stop reason: SDUPTHER

## 2019-10-25 RX ORDER — SIMVASTATIN 20 MG
20 TABLET ORAL
Qty: 90 TABLET | Refills: 3 | Status: SHIPPED | OUTPATIENT
Start: 2019-10-25 | End: 2020-05-05 | Stop reason: SDUPTHER

## 2019-10-25 RX ORDER — DIGOXIN 125 MCG
125 TABLET ORAL DAILY
Qty: 90 TABLET | Refills: 3 | Status: SHIPPED | OUTPATIENT
Start: 2019-10-25 | End: 2019-11-27 | Stop reason: SDUPTHER

## 2019-10-25 RX ORDER — METOPROLOL SUCCINATE 50 MG/1
75 TABLET, EXTENDED RELEASE ORAL DAILY
Qty: 135 TABLET | Refills: 3 | Status: SHIPPED | OUTPATIENT
Start: 2019-10-25 | End: 2020-05-05 | Stop reason: SDUPTHER

## 2019-11-27 ENCOUNTER — OFFICE VISIT (OUTPATIENT)
Dept: CARDIOLOGY CLINIC | Facility: CLINIC | Age: 54
End: 2019-11-27
Payer: MEDICARE

## 2019-11-27 VITALS
OXYGEN SATURATION: 97 % | BODY MASS INDEX: 32.64 KG/M2 | DIASTOLIC BLOOD PRESSURE: 90 MMHG | SYSTOLIC BLOOD PRESSURE: 132 MMHG | HEIGHT: 70 IN | WEIGHT: 228 LBS | HEART RATE: 70 BPM

## 2019-11-27 DIAGNOSIS — I10 HYPERTENSION, ESSENTIAL: ICD-10-CM

## 2019-11-27 DIAGNOSIS — I49.5 SINOATRIAL NODE DYSFUNCTION (HCC): ICD-10-CM

## 2019-11-27 DIAGNOSIS — Z79.01 CHRONIC ANTICOAGULATION: ICD-10-CM

## 2019-11-27 DIAGNOSIS — I48.0 PAROXYSMAL A-FIB (HCC): Primary | ICD-10-CM

## 2019-11-27 PROCEDURE — 99214 OFFICE O/P EST MOD 30 MIN: CPT | Performed by: INTERNAL MEDICINE

## 2019-11-27 RX ORDER — DIGOXIN 125 MCG
TABLET ORAL
Qty: 90 TABLET | Refills: 3
Start: 2019-11-27 | End: 2020-05-05 | Stop reason: SDUPTHER

## 2019-11-27 NOTE — PROGRESS NOTES
[facility] [place]  [address] [id number]  Cardiology Follow Up    Amor Hobson Sharon  1965  [id number]      1  Paroxysmal A-fib ([address] )     2  Chronic anticoagulation     3  Sinoatrial node dysfunction (HCC)     4  Hypertension, essential         Chief Complaint   Patient presents with    Follow-up       Interval History:  Patient presents for follow-up visit  Patient denies any history of chest pain shortness of breath  Patient denies any history of leg edema or orthopnea PND  No history of presyncope syncope  Patient states compliance with the present list of medications  Patient denies any bleeding issues  Patient is regularly followed by the pacemaker clinic      Patient Active Problem List   Diagnosis    Paroxysmal atrial fibrillation (HCC)    Hypertension, essential    Chronic anticoagulation    Dyspnea on effort     Past Medical History:   Diagnosis Date    Abdominal aortic aneurysm (AAA) (HCC)     Abnormal ECG     Atrial fibrillation (HCC)     Cardiac arrhythmia     Chest pain     Coronary atherosclerosis of native coronary artery     Hyperlipidemia     Hypertension     [facility]     Sinoatrial node dysfunction (HCC)     SOB (shortness of breath)      Social History     Socioeconomic History    Marital status: Single     Spouse name: Not on file    Number of children: Not on file    Years of education: Not on file    Highest education level: Not on file   Occupational History    Not on file   Social Needs    Financial resource strain: Not on file    Food insecurity:     Worry: Not on file     Inability: Not on file    Transportation needs:     Medical: Not on file     Non-medical: Not on file   Tobacco Use    Smoking status: Never Smoker    Smokeless tobacco: Never Used   Substance and Sexual Activity    Alcohol use: No    Drug use: Not on file    Sexual activity: Not on file   Lifestyle    Physical activity: Days per week: Not on file     Minutes per session: Not on file    Stress: Not on file   Relationships    Social connections:     Talks on phone: Not on file     Gets together: Not on file     Attends Christianity service: Not on file     Active member of club or organization: Not on file     Attends meetings of clubs or organizations: Not on file     Relationship status: Not on file    Intimate partner violence:     Fear of current or ex partner: Not on file     Emotionally abused: Not on file     Physically abused: Not on file     Forced sexual activity: Not on file   Other Topics Concern    Not on file   Social History Narrative    Not on file      History reviewed  No pertinent family history    Past Surgical History:   Procedure Laterality Date    CARDIAC CATHETERIZATION      INSERT / REPLACE / REMOVE PACEMAKER      PELVIC AND PARA-AORTIC LYMPH NODE DISSECTION         Current Outpatient Medications:     amLODIPine (NORVASC) 5 mg tablet, Take 1 tablet (5 mg total) by mouth daily, Disp: 90 tablet, Rfl: 3    Cyclobenzaprine HCl (FLEXERIL PO), 1 tablet at bedtime, Disp: , Rfl:     digoxin (DIGITEK) 0 125 mg tablet, Take 1 tablet (125 mcg total) by mouth daily, Disp: 90 tablet, Rfl: 3    divalproex sodium (DEPAKOTE) 500 mg EC tablet, 1 TABLET DAILY, Disp: , Rfl:     ELIQUIS 5 MG, Take 1 tablet (5 mg total) by mouth 2 (two) times a day, Disp: 180 tablet, Rfl: 3    FLUoxetine (PROzac) 10 mg capsule, 10 mg daily , Disp: , Rfl: 0    fluticasone (FLONASE) 50 mcg/act nasal spray, into each nostril, Disp: , Rfl:     hydrOXYzine HCL (ATARAX) 50 mg tablet, Take 1 tablet by mouth, Disp: , Rfl:     lisinopril (ZESTRIL) 5 mg tablet, Take 1 tablet (5 mg total) by mouth daily, Disp: 90 tablet, Rfl: 3    metoprolol succinate (TOPROL-XL) 50 mg 24 hr tablet, Take 1 5 tablets (75 mg total) by mouth daily, Disp: 135 tablet, Rfl: 3    omeprazole (PriLOSEC) 20 mg delayed release capsule, Take 20 mg by mouth daily, Disp: , Rfl: 0    ROZEREM 8 MG tablet, 8 mg daily at bedtime , Disp: , Rfl: 0    simvastatin (ZOCOR) 20 mg tablet, Take 1 tablet (20 mg total) by mouth daily at bedtime, Disp: 90 tablet, Rfl: 3    ULORIC 40 MG tablet, Take 40 mg by mouth daily, Disp: , Rfl: 0    ziprasidone (GEODON) 40 mg capsule, 40 mg daily at bedtime , Disp: , Rfl: 0  No Known Allergies    Labs:  No visits with results within 2 Month(s) from this visit  Latest known visit with results is:   Hospital Outpatient Visit on 04/25/2018   Component Date Value    Protocol Name 04/25/2018 Gabino Berg Time In Exercise Phase 04/25/2018 00:07:00     MAX  SYSTOLIC BP 09/55/8638 702     Max Diastolic Bp 72/49/8605 787     Max Heart Rate 04/25/2018 85     Max Predicted Heart Rate 04/25/2018 168     Reason for Termination 04/25/2018 Maximal exercise (symptom limited)     Test Indication 04/25/2018                      Value:Chest Discomfort  Dyspnea on effort      Target Hr Formular 04/25/2018 (220 - Age)*85%      Imaging: No results found  Review of Systems:  Review of Systems   REVIEW OF SYSTEMS:  Constitutional:  Denies fever or chills   Eyes:  Denies change in visual acuity   HENT:  Denies nasal congestion or sore throat   Respiratory:  Denies cough or shortness of breath   Cardiovascular:  Denies chest pain or edema   GI:  Denies abdominal pain, nausea, vomiting, bloody stools or diarrhea   :  Denies dysuria, frequency, difficulty in micturition and nocturia  Musculoskeletal:  Denies back pain or joint pain   Neurologic:  Denies headache, focal weakness or sensory changes   Endocrine:  Denies polyuria or polydipsia   Lymphatic:  Denies swollen glands   Psychiatric:  Denies depression or anxiety     Physical Exam:    /90   Pulse 70   Ht 5' 10" (1 778 m)   Wt 103 kg (228 lb)   SpO2 97%   BMI 32 71 kg/m²     Physical Exam   PHYSICAL EXAM:  General:  Patient is not in acute distress   Head: Normocephalic, Atraumatic    HEENT:  Both pupils normal-size atraumatic, normocephalic, nonicteric  Neck:  JVP not raised  Trachea central  No carotid bruit  Respiratory:  normal breath sounds no crackles  no rhonchi  Cardiovascular:  Regular rate and rhythm no S3 no murmurs  GI:  Abdomen soft nontender  No organomegaly  Lymphatic:  No cervical or inguinal lymphadenopathy  Neurologic:  Patient is awake alert, oriented   Grossly nonfocal  Extremities no edema      Discussion/Summary:  Patient with multiple medical problems who seems to be doing reasonably well from cardiac standpoint  Previous studies reviewed with patient  Medications reviewed and possible side effects discussed  concepts of cardiovascular disease , signs and symptoms of heart disease  Dietary and risk factor modification reinforced  All questions answered  Safety measures reviewed  Patient advised to report any problems prompting medical attention  Patient understands the risks and benefits of anticoagulation to prevent thromboembolic risk from atrial fibrillation  Patient will continue to follow up with pacemaker clinic  Given history of chronic kidney disease will decrease the dosage of digoxin/digitek to every other day  Continue rest of medications  Symptoms to watch out from cardiac standpoint which would indicate the need for further cardiac evaluation discussed at length with patient and family  Follow-up in 6 months or earlier as needed  Follow-up with primary care physician  Recent blood work reviewed  Results of recent pacemaker interrogation data also reviewed

## 2019-12-11 ENCOUNTER — REMOTE DEVICE CLINIC VISIT (OUTPATIENT)
Dept: CARDIOLOGY CLINIC | Facility: CLINIC | Age: 54
End: 2019-12-11
Payer: MEDICARE

## 2019-12-11 DIAGNOSIS — Z95.0 PRESENCE OF PERMANENT CARDIAC PACEMAKER: Primary | ICD-10-CM

## 2019-12-11 PROCEDURE — 93296 REM INTERROG EVL PM/IDS: CPT | Performed by: INTERNAL MEDICINE

## 2019-12-11 PROCEDURE — 93294 REM INTERROG EVL PM/LDLS PM: CPT | Performed by: INTERNAL MEDICINE

## 2019-12-11 NOTE — PROGRESS NOTES
SJM DUAL CHAMBER PM  MERLIN TRANSMISSION:  BATTERY VOLTAGE ADEQUATE (2 1 YR)   AP 91%  <1%    ALL LEAD PARAMETERS WITHIN NORMAL LIMITS   NO HIGH RATE EPISODES   NORMAL DEVICE FUNCTION  Maci Lockhart

## 2020-03-13 ENCOUNTER — IN-CLINIC DEVICE VISIT (OUTPATIENT)
Dept: CARDIOLOGY CLINIC | Facility: CLINIC | Age: 55
End: 2020-03-13
Payer: MEDICARE

## 2020-03-13 ENCOUNTER — TELEPHONE (OUTPATIENT)
Dept: CARDIOLOGY CLINIC | Facility: CLINIC | Age: 55
End: 2020-03-13

## 2020-03-13 DIAGNOSIS — Z95.0 PRESENCE OF PERMANENT CARDIAC PACEMAKER: Primary | ICD-10-CM

## 2020-03-13 PROCEDURE — 93280 PM DEVICE PROGR EVAL DUAL: CPT | Performed by: INTERNAL MEDICINE

## 2020-03-13 NOTE — PROGRESS NOTES
SJM DUAL CHAMBER PM - ACTIVE SYSTEM IS MRI CONDITIONAL  DEVICE INTERROGATED IN THE [place] OFFICE:  BATTERY VOLTAGE ADEQUATE (1 4 YR)   AP 90%  <1%    ALL LEAD PARAMETERS WITHIN NORMAL LIMITS   NO HIGH RATE EPISODES   NO PROGRAMMING CHANGES MADE TO DEVICE PARAMETERS   NORMAL DEVICE FUNCTION   RG

## 2020-05-05 ENCOUNTER — TELEMEDICINE (OUTPATIENT)
Dept: CARDIOLOGY CLINIC | Facility: CLINIC | Age: 55
End: 2020-05-05
Payer: MEDICARE

## 2020-05-05 ENCOUNTER — TELEPHONE (OUTPATIENT)
Dept: CARDIOLOGY CLINIC | Facility: CLINIC | Age: 55
End: 2020-05-05

## 2020-05-05 DIAGNOSIS — Z79.01 CHRONIC ANTICOAGULATION: ICD-10-CM

## 2020-05-05 DIAGNOSIS — I71.2 THORACIC AORTIC ANEURYSM WITHOUT RUPTURE (HCC): ICD-10-CM

## 2020-05-05 DIAGNOSIS — I49.5 SINOATRIAL NODE DYSFUNCTION (HCC): ICD-10-CM

## 2020-05-05 DIAGNOSIS — I10 HYPERTENSION, ESSENTIAL: ICD-10-CM

## 2020-05-05 DIAGNOSIS — E78.2 COMBINED HYPERLIPIDEMIA: ICD-10-CM

## 2020-05-05 DIAGNOSIS — I48.0 PAROXYSMAL A-FIB (HCC): Primary | ICD-10-CM

## 2020-05-05 PROCEDURE — 99442 PR PHYS/QHP TELEPHONE EVALUATION 11-20 MIN: CPT | Performed by: INTERNAL MEDICINE

## 2020-05-05 RX ORDER — AMLODIPINE BESYLATE 5 MG/1
5 TABLET ORAL DAILY
Qty: 90 TABLET | Refills: 3 | Status: SHIPPED | OUTPATIENT
Start: 2020-05-05

## 2020-05-05 RX ORDER — APIXABAN 5 MG/1
5 TABLET, FILM COATED ORAL 2 TIMES DAILY
Qty: 180 TABLET | Refills: 3 | Status: SHIPPED | OUTPATIENT
Start: 2020-05-05 | End: 2020-05-05 | Stop reason: SDUPTHER

## 2020-05-05 RX ORDER — APIXABAN 5 MG/1
5 TABLET, FILM COATED ORAL 2 TIMES DAILY
Qty: 180 TABLET | Refills: 3 | Status: SHIPPED | OUTPATIENT
Start: 2020-05-05 | End: 2021-07-26 | Stop reason: SDUPTHER

## 2020-05-05 RX ORDER — METOPROLOL SUCCINATE 50 MG/1
75 TABLET, EXTENDED RELEASE ORAL DAILY
Qty: 135 TABLET | Refills: 3 | Status: SHIPPED | OUTPATIENT
Start: 2020-05-05 | End: 2021-04-07 | Stop reason: SDUPTHER

## 2020-05-05 RX ORDER — SIMVASTATIN 20 MG
20 TABLET ORAL
Qty: 90 TABLET | Refills: 3 | Status: SHIPPED | OUTPATIENT
Start: 2020-05-05 | End: 2021-04-07 | Stop reason: SDUPTHER

## 2020-05-05 RX ORDER — METOPROLOL SUCCINATE 50 MG/1
75 TABLET, EXTENDED RELEASE ORAL DAILY
Qty: 135 TABLET | Refills: 3 | Status: SHIPPED | OUTPATIENT
Start: 2020-05-05 | End: 2020-05-05 | Stop reason: SDUPTHER

## 2020-05-05 RX ORDER — DIGOXIN 125 MCG
TABLET ORAL
Qty: 90 TABLET | Refills: 3
Start: 2020-05-05 | End: 2021-01-25 | Stop reason: SDUPTHER

## 2020-05-05 RX ORDER — SIMVASTATIN 20 MG
20 TABLET ORAL
Qty: 90 TABLET | Refills: 3 | Status: SHIPPED | OUTPATIENT
Start: 2020-05-05 | End: 2020-05-05 | Stop reason: SDUPTHER

## 2020-05-05 RX ORDER — LISINOPRIL 5 MG/1
5 TABLET ORAL DAILY
Qty: 90 TABLET | Refills: 3 | Status: SHIPPED | OUTPATIENT
Start: 2020-05-05

## 2020-06-24 ENCOUNTER — REMOTE DEVICE CLINIC VISIT (OUTPATIENT)
Dept: CARDIOLOGY CLINIC | Facility: CLINIC | Age: 55
End: 2020-06-24
Payer: MEDICARE

## 2020-06-24 DIAGNOSIS — Z95.0 PRESENCE OF PERMANENT CARDIAC PACEMAKER: Primary | ICD-10-CM

## 2020-06-24 PROCEDURE — 93296 REM INTERROG EVL PM/IDS: CPT | Performed by: INTERNAL MEDICINE

## 2020-06-24 PROCEDURE — 93294 REM INTERROG EVL PM/LDLS PM: CPT | Performed by: INTERNAL MEDICINE

## 2020-07-22 ENCOUNTER — REMOTE DEVICE CLINIC VISIT (OUTPATIENT)
Dept: CARDIOLOGY CLINIC | Facility: CLINIC | Age: 55
End: 2020-07-22

## 2020-07-22 DIAGNOSIS — Z95.0 CARDIAC PACEMAKER IN SITU: Primary | ICD-10-CM

## 2020-07-22 PROCEDURE — RECHECK: Performed by: INTERNAL MEDICINE

## 2020-07-22 NOTE — PROGRESS NOTES
Results for orders placed or performed in visit on 07/22/20   Cardiac EP device report    Narrative    SJM DUAL CHAMBER PM - ACTIVE SYSTEM IS MRI CONDITIONAL  MERLIN TRANSMISSION TO CHECK BATTERY STATUS- NB: BATTERY VOLTAGE ADEQUATE (11 4 MOS)  AP-91%, <1%  ALL AVAILABLE LEAD PARAMETERS WITHIN NORMAL LIMITS  NO SIGNIFICANT HIGH RATE EPISODES  NORMAL DEVICE FUNCTION   GV

## 2020-09-23 ENCOUNTER — REMOTE DEVICE CLINIC VISIT (OUTPATIENT)
Dept: CARDIOLOGY CLINIC | Facility: CLINIC | Age: 55
End: 2020-09-23
Payer: MEDICARE

## 2020-09-23 DIAGNOSIS — Z95.0 PRESENCE OF PERMANENT CARDIAC PACEMAKER: Primary | ICD-10-CM

## 2020-09-23 PROCEDURE — 93296 REM INTERROG EVL PM/IDS: CPT | Performed by: INTERNAL MEDICINE

## 2020-09-23 PROCEDURE — 93294 REM INTERROG EVL PM/LDLS PM: CPT | Performed by: INTERNAL MEDICINE

## 2020-09-23 NOTE — PROGRESS NOTES
MEMO DUAL CHAMBER PM - ACTIVE SYSTEM IS MRI CONDITIONAL   MERLIN TRANSMISSION:  BATTERY VOLTAGE NEARING JORDYN (6 8 MONTHS)   WILL CONTINUE MONTHLY BATTERY CHECKS   AP 90%  <1%    ALL LEAD PARAMETERS WITHIN NORMAL LIMITS   NO HIGH RATE EPISODES   NORMAL DEVICE FUNCTION  Maci Lockhart

## 2020-10-21 ENCOUNTER — REMOTE DEVICE CLINIC VISIT (OUTPATIENT)
Dept: CARDIOLOGY CLINIC | Facility: CLINIC | Age: 55
End: 2020-10-21

## 2020-10-21 DIAGNOSIS — Z95.0 PRESENCE OF PERMANENT CARDIAC PACEMAKER: Primary | ICD-10-CM

## 2020-10-21 PROCEDURE — RECHECK: Performed by: INTERNAL MEDICINE

## 2020-11-25 ENCOUNTER — REMOTE DEVICE CLINIC VISIT (OUTPATIENT)
Dept: CARDIOLOGY CLINIC | Facility: CLINIC | Age: 55
End: 2020-11-25
Payer: MEDICARE

## 2020-11-25 DIAGNOSIS — Z95.0 PRESENCE OF CARDIAC PACEMAKER: Primary | ICD-10-CM

## 2020-11-25 PROCEDURE — 93296 REM INTERROG EVL PM/IDS: CPT | Performed by: INTERNAL MEDICINE

## 2020-11-25 PROCEDURE — 93294 REM INTERROG EVL PM/LDLS PM: CPT | Performed by: INTERNAL MEDICINE

## 2020-12-23 ENCOUNTER — REMOTE DEVICE CLINIC VISIT (OUTPATIENT)
Dept: CARDIOLOGY CLINIC | Facility: CLINIC | Age: 55
End: 2020-12-23
Payer: MEDICARE

## 2020-12-23 DIAGNOSIS — Z95.0 PRESENCE OF CARDIAC PACEMAKER: Primary | ICD-10-CM

## 2020-12-23 PROCEDURE — 93296 REM INTERROG EVL PM/IDS: CPT | Performed by: INTERNAL MEDICINE

## 2020-12-23 PROCEDURE — 93294 REM INTERROG EVL PM/LDLS PM: CPT | Performed by: INTERNAL MEDICINE

## 2021-01-25 DIAGNOSIS — I48.0 PAROXYSMAL A-FIB (HCC): ICD-10-CM

## 2021-01-25 RX ORDER — DIGOXIN 125 MCG
TABLET ORAL
Qty: 90 TABLET | Refills: 3
Start: 2021-01-25 | End: 2021-01-27 | Stop reason: SDUPTHER

## 2021-01-26 ENCOUNTER — REMOTE DEVICE CLINIC VISIT (OUTPATIENT)
Dept: CARDIOLOGY CLINIC | Facility: CLINIC | Age: 56
End: 2021-01-26

## 2021-01-26 DIAGNOSIS — Z95.0 PRESENCE OF PERMANENT CARDIAC PACEMAKER: Primary | ICD-10-CM

## 2021-01-26 PROCEDURE — RECHECK: Performed by: INTERNAL MEDICINE

## 2021-01-26 NOTE — PROGRESS NOTES
TOMASZM DUAL CHAMBER PM - ACTIVE SYSTEM IS MRI CONDITIONAL   NB MERLIN TRANSMISSION TO CHECK BATTERY STATUS:  BATTERY VOLTAGE NEARING JORDYN (2 60V/RRT=2 60V, JORDYN NOT INDICATED, 0 - 3 MONTHS REMAINING)   WILL CONTINUE MONTHLY BATTERY CHECKS   AP 89%  <1%    ALL LEAD PARAMETERS WITHIN NORMAL LIMITS   NO HIGH RATE EPISODES   NORMAL DEVICE FUNCTION  Maci Lockhart

## 2021-01-27 RX ORDER — DIGOXIN 125 MCG
TABLET ORAL
Qty: 90 TABLET | Refills: 1 | Status: SHIPPED | OUTPATIENT
Start: 2021-01-27 | End: 2021-04-07 | Stop reason: SDUPTHER

## 2021-02-26 ENCOUNTER — REMOTE DEVICE CLINIC VISIT (OUTPATIENT)
Dept: CARDIOLOGY CLINIC | Facility: CLINIC | Age: 56
End: 2021-02-26

## 2021-02-26 DIAGNOSIS — Z95.0 CARDIAC PACEMAKER IN SITU: Primary | ICD-10-CM

## 2021-02-26 PROCEDURE — RECHECK: Performed by: INTERNAL MEDICINE

## 2021-02-26 NOTE — PROGRESS NOTES
Results for orders placed or performed in visit on 02/26/21   Cardiac EP device report    Narrative    SJ DUAL CHAMBER PM - ACTIVE SYSTEM IS MRI CONDITIONAL  MERLIN TRANSMISSION TO CHECK BATTERY STATUS- NB: BATTERY VOLTAGE NEARING JORDYN (<3 MOS)  WILL SCHEDULE MONTHLY BATTERY CHECKS  AP-90%, <1%  ALL AVAILABLE LEAD PARAMETERS WITHIN NORMAL LIMITS  NO SIGNIFICANT HIGH RATE EPISODES  NORMAL DEVICE FUNCTION   GV

## 2021-03-19 ENCOUNTER — IN-CLINIC DEVICE VISIT (OUTPATIENT)
Dept: CARDIOLOGY CLINIC | Facility: CLINIC | Age: 56
End: 2021-03-19
Payer: MEDICARE

## 2021-03-19 ENCOUNTER — TELEPHONE (OUTPATIENT)
Dept: CARDIOLOGY CLINIC | Facility: CLINIC | Age: 56
End: 2021-03-19

## 2021-03-19 DIAGNOSIS — Z95.0 PRESENCE OF PERMANENT CARDIAC PACEMAKER: Primary | ICD-10-CM

## 2021-03-19 PROCEDURE — 93280 PM DEVICE PROGR EVAL DUAL: CPT | Performed by: INTERNAL MEDICINE

## 2021-03-19 NOTE — PROGRESS NOTES
SJM DUAL CHAMBER PM - ACTIVE SYSTEM IS MRI CONDITIONAL   DEVICE INTERROGATED IN THE [place] OFFICE:  BATTERY VOLTAGE NEARING JORDYN (2 57V/JORDYN = 2 60V, JORDYN NOT INDICATED)  WILL CONTINUE MONTHLY BATTERY CHECKS   AP 90%  <1%    ALL LEAD PARAMETERS WITHIN NORMAL LIMITS   NO HIGH RATE EPISODES   NO PROGRAMMING CHANGES MADE TO DEVICE PARAMETERS   NORMAL DEVICE FUNCTION   RG

## 2021-03-19 NOTE — TELEPHONE ENCOUNTER
Patient's Emergency Contact who is Yonathan's niece called back to state that she wants to be put back on as emergency contact  She states that he has no one else, so she will continue to take care of him and his medications  She wanted the clinical summary mailed to her from today's visit which was done

## 2021-03-19 NOTE — TELEPHONE ENCOUNTER
Pt was here for a pacer check & his niece came w/ him & she wants us to let Dr David Hudson know that she wants nothing to do w/ pt & said that he is old enough to come to his own appts, manage his meds & aMrtha Else gave me back the pacer appt olivia & said that we can keep it  Martha Else wants off his emerg contact & pt has no other ppl to list as an emerg contact

## 2021-03-30 ENCOUNTER — TELEPHONE (OUTPATIENT)
Dept: CARDIOLOGY CLINIC | Facility: CLINIC | Age: 56
End: 2021-03-30

## 2021-03-30 DIAGNOSIS — Z45.010 PACEMAKER BATTERY DEPLETION: ICD-10-CM

## 2021-03-30 DIAGNOSIS — I49.5 SINOATRIAL NODE DYSFUNCTION (HCC): Primary | ICD-10-CM

## 2021-03-30 NOTE — TELEPHONE ENCOUNTER
----- Message from Venecia Leavitt MD sent at 3/30/2021 10:13 AM EDT -----  I talked to the patient's niece  The cannot travel to [address]  Please see if we can schedule the pacemaker generator change at [facility] sometime in April please  Patient will hold Eliquis 2 days prior to the procedure  Order for the procedure in the chart    Better to talk to patient's niece  As  patient is somewhat challenged

## 2021-04-06 ENCOUNTER — TELEPHONE (OUTPATIENT)
Dept: CARDIOLOGY CLINIC | Facility: CLINIC | Age: 56
End: 2021-04-06

## 2021-04-06 NOTE — TELEPHONE ENCOUNTER
----- Message from Emerson Crook DO sent at 3/30/2021 12:17 PM EDT -----  Regarding: RE: Gen change at [facility]  I have to leave that up to [place]    Thank you  ----- Message -----  From: Quinn Sterling LPN  Sent: 6/93/8939  10:25 AM EDT  To: Jp Palafox MD, Emerson Crook DO, #  Subject: Gen change at THE [facility],  This pt requires a generator change at [facility]  Our block time is the 3rd Monday of the month from 9-11 am so for April it will be 4/19/21  Is there anyway that Dr Michelle Silverio can come up during that time? If not is there any other availability in April and we can see if we can work it out with the hospital? Thank you! From: Jp Palafox MD  Sent: 3/30/2021  10:13 AM EDT  To: Emerson Crook DO, Quinn Sterling LPN    I talked to the patient's niece  The cannot travel to [place]  Please see if we can schedule the pacemaker generator change at [facility] sometime in April please  Patient will hold Eliquis 2 days prior to the procedure  Order for the procedure in the chart    Better to talk to patient's niece  As  patient is somewhat challenged

## 2021-04-06 NOTE — TELEPHONE ENCOUNTER
Dennie Artis, any update for Dr Radha Deng to come up to [facility] for a gen change this month?  Thank you

## 2021-04-07 ENCOUNTER — OFFICE VISIT (OUTPATIENT)
Dept: CARDIOLOGY CLINIC | Facility: CLINIC | Age: 56
End: 2021-04-07
Payer: MEDICARE

## 2021-04-07 ENCOUNTER — TELEPHONE (OUTPATIENT)
Dept: CARDIOLOGY CLINIC | Facility: CLINIC | Age: 56
End: 2021-04-07

## 2021-04-07 VITALS
HEART RATE: 55 BPM | DIASTOLIC BLOOD PRESSURE: 74 MMHG | BODY MASS INDEX: 33.64 KG/M2 | WEIGHT: 235 LBS | HEIGHT: 70 IN | SYSTOLIC BLOOD PRESSURE: 118 MMHG | OXYGEN SATURATION: 97 %

## 2021-04-07 DIAGNOSIS — I48.0 PAROXYSMAL A-FIB (HCC): ICD-10-CM

## 2021-04-07 DIAGNOSIS — R07.89 CHEST DISCOMFORT: Primary | ICD-10-CM

## 2021-04-07 DIAGNOSIS — E78.2 COMBINED HYPERLIPIDEMIA: ICD-10-CM

## 2021-04-07 DIAGNOSIS — I10 HYPERTENSION, ESSENTIAL: ICD-10-CM

## 2021-04-07 PROBLEM — Z45.010 EXHAUSTION OF CARDIAC PACEMAKER BATTERY: Status: ACTIVE | Noted: 2021-04-07

## 2021-04-07 PROBLEM — I49.5 SICK SINUS SYNDROME (HCC): Status: ACTIVE | Noted: 2021-04-07

## 2021-04-07 PROCEDURE — 99214 OFFICE O/P EST MOD 30 MIN: CPT | Performed by: INTERNAL MEDICINE

## 2021-04-07 PROCEDURE — 93000 ELECTROCARDIOGRAM COMPLETE: CPT | Performed by: INTERNAL MEDICINE

## 2021-04-07 RX ORDER — DIGOXIN 125 MCG
TABLET ORAL
Qty: 45 TABLET | Refills: 3 | Status: SHIPPED | OUTPATIENT
Start: 2021-04-07 | End: 2022-02-16 | Stop reason: SDUPTHER

## 2021-04-07 RX ORDER — SIMVASTATIN 20 MG
20 TABLET ORAL
Qty: 90 TABLET | Refills: 3 | Status: SHIPPED | OUTPATIENT
Start: 2021-04-07 | End: 2022-02-16 | Stop reason: SDUPTHER

## 2021-04-07 RX ORDER — METOPROLOL SUCCINATE 50 MG/1
75 TABLET, EXTENDED RELEASE ORAL DAILY
Qty: 135 TABLET | Refills: 3 | Status: SHIPPED | OUTPATIENT
Start: 2021-04-07 | End: 2022-02-16 | Stop reason: SDUPTHER

## 2021-04-07 NOTE — ASSESSMENT & PLAN NOTE
Patient has only 5-6 weeks pacemaker battery remaining  Patient wants  Procedure to be done locally due to transportation issues  We have tried scheduling patient as early as possible in [facility], if we are not want to get appointment, patient will be referred to [facility]  This was discussed with the patient and patient is agreeable with the plan

## 2021-04-07 NOTE — ASSESSMENT & PLAN NOTE
History of sick sinus syndrome patient is status post pacemaker placement  Patient is near the end of battery life for the pacemaker    EKG done in office today> normal pacing

## 2021-04-07 NOTE — TELEPHONE ENCOUNTER
Patient's Niece called and stated that she does not want to be contacted any more regarding Yonathan's health  She will not be taking him into the office  Any more and she wants to be removed from Emergency contact  She knows that she has said this before but she states that this time it is definite

## 2021-04-07 NOTE — PROGRESS NOTES
Assessment/Plan:    Chest discomfort  Patient reports of chest discomfort on and off, not related to exertion  From the history it appears to be atypical   EKG done in the office-no evidence of ST segment changes, normal pacing  Paroxysmal A-fib (HCC)  History of paroxysmal atrial fibrillation, patient is on anticoagulation with Eliquis  Patient is on Digoxin and Metoprolol  No evidence of  Bleeding, patient seems to be tolerating therapy well  Repeat echo has been ordered  If declining EF patient can be considered for defibrillator- pacemaker    Exhaustion of cardiac pacemaker battery  Patient has only 5-6 weeks pacemaker battery remaining  Patient wants  Procedure to be done locally due to transportation issues  We have tried scheduling patient as early as possible in [facility], if we are not want to get appointment, patient will be referred to [facility]  This was discussed with the patient and patient is agreeable with the plan  Sick sinus syndrome (HCC)  History of sick sinus syndrome patient is status post pacemaker placement  Patient is near the end of battery life for the pacemaker  EKG done in office today> normal pacing       Diagnoses and all orders for this visit:    Chest discomfort  -     POCT ECG    Combined hyperlipidemia  -     simvastatin (ZOCOR) 20 mg tablet; Take 1 tablet (20 mg total) by mouth daily at bedtime    Hypertension, essential  -     metoprolol succinate (TOPROL-XL) 50 mg 24 hr tablet; Take 1 5 tablets (75 mg total) by mouth daily    Paroxysmal A-fib (HCC)  -     digoxin (Digitek) 0 125 mg tablet; 1 tab every other day  -     Echo complete with contrast if indicated; Future          Subjective:      Patient ID: Jovi Gutiérrez is a 48 y o  male      HPI   This is a 48years old male with past medical history of paroxysmal atrial fibrillation, hypertension, sick sinus syndrome status post pacemaker placement who comes to the clinic for routine follow-up  Patient reports of episodes of chest pain not related to exertion on and off at least once a day  Patient does not report of any anginal symptoms, no palpitations, no orthopnea or PND episodes  Patient reports since COVID-19 has it he has not exerted on not, plans on routine exercise  Of note patient is low on his battery for pacemaker placement and patient wants this to be done in local area since he has transportation issues  The following portions of the patient's history were reviewed and updated as appropriate: allergies, current medications, past family history, past medical history, past social history, past surgical history and problem list     Review of Systems   Constitutional: Negative for activity change, appetite change, chills and diaphoresis  HENT: Negative for congestion  Respiratory: Negative for apnea and chest tightness  Cardiovascular: Negative for chest pain, palpitations and leg swelling  Gastrointestinal: Negative for abdominal distention and abdominal pain  Endocrine: Negative for cold intolerance and heat intolerance  Genitourinary: Negative for difficulty urinating and dysuria  Musculoskeletal: Negative for arthralgias and back pain  Allergic/Immunologic: Negative for environmental allergies  Neurological: Negative for dizziness  Hematological: Negative for adenopathy  Psychiatric/Behavioral: Negative for agitation  Objective:      /74   Pulse 55   Ht 5' 10" (1 778 m)   Wt 107 kg (235 lb)   SpO2 97%   BMI 33 72 kg/m²          Physical Exam  Constitutional:       Appearance: Normal appearance  HENT:      Head: Normocephalic and atraumatic  Nose: Nose normal       Mouth/Throat:      Mouth: Mucous membranes are moist    Eyes:      Pupils: Pupils are equal, round, and reactive to light  Neck:      Musculoskeletal: Normal range of motion  Cardiovascular:      Rate and Rhythm: Normal rate and regular rhythm        Pulses: Normal pulses  Heart sounds: Normal heart sounds  Pulmonary:      Effort: Pulmonary effort is normal       Breath sounds: Normal breath sounds  Abdominal:      General: Abdomen is flat  There is no distension  Palpations: Abdomen is soft  There is no mass  Tenderness: There is no abdominal tenderness  Musculoskeletal: Normal range of motion  Right lower leg: No edema  Left lower leg: No edema  Skin:     General: Skin is warm  Capillary Refill: Capillary refill takes less than 2 seconds  Neurological:      General: No focal deficit present  Mental Status: He is alert and oriented to person, place, and time

## 2021-04-07 NOTE — ASSESSMENT & PLAN NOTE
Patient reports of chest discomfort on and off, not related to exertion  From the history it appears to be atypical   EKG done in the office-no evidence of ST segment changes, normal pacing

## 2021-04-07 NOTE — ASSESSMENT & PLAN NOTE
History of paroxysmal atrial fibrillation, patient is on anticoagulation with Eliquis  Patient is on Digoxin and Metoprolol  No evidence of  Bleeding, patient seems to be tolerating therapy well  Repeat echo has been ordered    If declining EF patient can be considered for defibrillator- pacemaker

## 2021-04-08 ENCOUNTER — HOSPITAL ENCOUNTER (OUTPATIENT)
Dept: NON INVASIVE DIAGNOSTICS | Facility: CLINIC | Age: 56
Discharge: HOME | End: 2021-04-08
Payer: MEDICARE

## 2021-04-08 ENCOUNTER — TELEPHONE (OUTPATIENT)
Dept: CARDIOLOGY CLINIC | Facility: CLINIC | Age: 56
End: 2021-04-08

## 2021-04-08 DIAGNOSIS — I48.0 PAROXYSMAL A-FIB (HCC): ICD-10-CM

## 2021-04-08 PROCEDURE — 93306 TTE W/DOPPLER COMPLETE: CPT

## 2021-04-08 PROCEDURE — 93306 TTE W/DOPPLER COMPLETE: CPT | Performed by: INTERNAL MEDICINE

## 2021-04-08 NOTE — TELEPHONE ENCOUNTER
----- Message from Harry Franklin MD sent at 4/7/2021  7:30 PM EDT -----  Regarding: Fax office notes   This patient will have pacemaker generator change at [facility] in the next 1 to 2 weeks  Please fax the office note as well as last pacemaker interrogation report to Jose Angel Delaney  Fax number [phone]   Please let their office know that I talked to him  Patient should hold Eliquis 2 days prior to the procedure

## 2021-04-08 NOTE — TELEPHONE ENCOUNTER
Office note and interrogation faxed to Dr Ashley Loud office with confirmation  Called Susu Hughes to make her aware, no answer, phone kept ringing

## 2021-04-08 NOTE — TELEPHONE ENCOUNTER
----- Message from Stacey Waggoner MD sent at 4/8/2021  1:07 PM EDT -----  Please call  Echocardiogram shows normal ejection fraction with no significant valvular abnormalities  Please fax this report also to [address] [place] [address]

## 2021-04-08 NOTE — TELEPHONE ENCOUNTER
Report faxed to Dr King Driscoll office, confirmation received   Attempted to elizabeth Phillips, no answer

## 2021-05-27 ENCOUNTER — REMOTE DEVICE CLINIC VISIT (OUTPATIENT)
Dept: CARDIOLOGY CLINIC | Facility: CLINIC | Age: 56
End: 2021-05-27

## 2021-05-27 DIAGNOSIS — Z95.0 PACEMAKER: Primary | ICD-10-CM

## 2021-05-27 PROCEDURE — RECHECK: Performed by: INTERNAL MEDICINE

## 2021-05-28 NOTE — PROGRESS NOTES
Results for orders placed or performed in visit on 05/27/21   Cardiac EP device report    Narrative    SJM/DUAL CHAMBER PPM (DDDR MODE) - [facility] TRANSMISSION: STATUS: GENERATOR CHANGE ON 4/19/21 BY DR Huma Salvador @ [address]  BATTERY ADEQUATE (9 8 YRS)  AP 93%;  0%  ALL AVAILABLE LEAD PARAMETERS WITHIN NORMAL LIMITS  NO EPISODES  NORMAL DEVICE FUNCTION  WILL F/U REMOTELY & ONCE A YEAR IN OFFICE   PL

## 2021-06-09 ENCOUNTER — OFFICE VISIT (OUTPATIENT)
Dept: CARDIOLOGY CLINIC | Facility: CLINIC | Age: 56
End: 2021-06-09
Payer: MEDICARE

## 2021-06-09 VITALS
HEART RATE: 60 BPM | DIASTOLIC BLOOD PRESSURE: 76 MMHG | HEIGHT: 70 IN | BODY MASS INDEX: 33.21 KG/M2 | SYSTOLIC BLOOD PRESSURE: 120 MMHG | OXYGEN SATURATION: 97 % | WEIGHT: 232 LBS

## 2021-06-09 DIAGNOSIS — I48.0 PAROXYSMAL A-FIB (HCC): Primary | ICD-10-CM

## 2021-06-09 DIAGNOSIS — I10 HYPERTENSION, ESSENTIAL: ICD-10-CM

## 2021-06-09 DIAGNOSIS — E78.2 COMBINED HYPERLIPIDEMIA: ICD-10-CM

## 2021-06-09 DIAGNOSIS — Z79.01 CHRONIC ANTICOAGULATION: ICD-10-CM

## 2021-06-09 PROCEDURE — 99214 OFFICE O/P EST MOD 30 MIN: CPT | Performed by: INTERNAL MEDICINE

## 2021-06-09 NOTE — PROGRESS NOTES
[facility] [address] [place] [address] [id number]  Cardiology Follow Up    Anjali Villafuerte Learn  1965  [id number]      1  Paroxysmal A-fib ([facility] )     2  Hypertension, essential     3  Chronic anticoagulation     4  Combined hyperlipidemia         Chief Complaint   Patient presents with    Follow-up       Interval History:  Patient presents for follow-up visit  Patient denies any history of chest pain shortness of breath  Patient denies any history of leg edema or orthopnea PND  No history of presyncope syncope  Patient states compliance with the present list of medications  Patient denies any  Bleeding issues  Patient recently had pacemaker generator change      Patient Active Problem List   Diagnosis    Paroxysmal A-fib ([facility] )    Hypertension, essential    Chronic anticoagulation    Chest discomfort    Dyspnea on effort    Exhaustion of cardiac pacemaker battery    Sick sinus syndrome [facility])     Past Medical History:   Diagnosis Date    Abdominal aortic aneurysm (AAA) (HCC)     Abnormal ECG     Atrial fibrillation (HCC)     Cardiac arrhythmia     Chest pain     Coronary atherosclerosis of native coronary artery     Hyperlipidemia     Hypertension     Myocardial infarction ([facility] )     Sinoatrial node dysfunction (HCC)     SOB (shortness of breath)      Social History     Socioeconomic History    Marital status: Single     Spouse name: Not on file    Number of children: Not on file    Years of education: Not on file    Highest education level: Not on file   Occupational History    Not on file   Social Needs    Financial resource strain: Not on file    Food insecurity     Worry: Not on file     Inability: Not on file    Transportation needs     Medical: Not on file     Non-medical: Not on file   Tobacco Use    Smoking status: Never Smoker    Smokeless tobacco: Never Used   Substance and Sexual Activity    Alcohol use: No    Drug use: Not on file    Sexual activity: Not on file   Lifestyle    Physical activity     Days per week: Not on file     Minutes per session: Not on file    Stress: Not on file   Relationships    Social connections     Talks on phone: Not on file     Gets together: Not on file     Attends Denominational service: Not on file     Active member of club or organization: Not on file     Attends meetings of clubs or organizations: Not on file     Relationship status: Not on file    Intimate partner violence     Fear of current or ex partner: Not on file     Emotionally abused: Not on file     Physically abused: Not on file     Forced sexual activity: Not on file   Other Topics Concern    Not on file   Social History Narrative    Not on file      History reviewed  No pertinent family history    Past Surgical History:   Procedure Laterality Date    CARDIAC CATHETERIZATION      INSERT / REPLACE / REMOVE PACEMAKER      PELVIC AND PARA-AORTIC LYMPH NODE DISSECTION         Current Outpatient Medications:     amLODIPine (NORVASC) 5 mg tablet, Take 1 tablet (5 mg total) by mouth daily, Disp: 90 tablet, Rfl: 3    Cyclobenzaprine HCl (FLEXERIL PO), 1 tablet at bedtime, Disp: , Rfl:     digoxin (Digitek) 0 125 mg tablet, 1 tab every other day, Disp: 45 tablet, Rfl: 3    divalproex sodium (DEPAKOTE) 500 mg EC tablet, 1 TABLET DAILY, Disp: , Rfl:     ELIQUIS 5 MG, Take 1 tablet (5 mg total) by mouth 2 (two) times a day, Disp: 180 tablet, Rfl: 3    FLUoxetine (PROzac) 10 mg capsule, 10 mg daily , Disp: , Rfl: 0    fluticasone (FLONASE) 50 mcg/act nasal spray, into each nostril, Disp: , Rfl:     hydrOXYzine HCL (ATARAX) 50 mg tablet, Take 1 tablet by mouth, Disp: , Rfl:     lisinopril (ZESTRIL) 5 mg tablet, Take 1 tablet (5 mg total) by mouth daily, Disp: 90 tablet, Rfl: 3    metoprolol succinate (TOPROL-XL) 50 mg 24 hr tablet, Take 1 5 tablets (75 mg total) by mouth daily, Disp: 135 tablet, Rfl: 3    omeprazole (PriLOSEC) 20 mg delayed release capsule, Take 20 mg by mouth daily, Disp: , Rfl: 0    ROZEREM 8 MG tablet, 8 mg daily at bedtime , Disp: , Rfl: 0    simvastatin (ZOCOR) 20 mg tablet, Take 1 tablet (20 mg total) by mouth daily at bedtime, Disp: 90 tablet, Rfl: 3    ULORIC 40 MG tablet, Take 40 mg by mouth daily, Disp: , Rfl: 0    ziprasidone (GEODON) 40 mg capsule, 40 mg daily at bedtime , Disp: , Rfl: 0  No Known Allergies    Labs:  No visits with results within 2 Month(s) from this visit  Latest known visit with results is:   Hospital Outpatient Visit on 04/25/2018   Component Date Value    Protocol Name 04/25/2018 Taylor Franco Time In Exercise Phase 04/25/2018 00:07:00     MAX  SYSTOLIC BP 42/47/5900 402     Max Diastolic Bp 67/63/2073 711     Max Heart Rate 04/25/2018 85     Max Predicted Heart Rate 04/25/2018 168     Reason for Termination 04/25/2018 Maximal exercise (symptom limited)     Test Indication 04/25/2018                      Value:Chest Discomfort  Dyspnea on effort      Target Hr Formular 04/25/2018 (220 - Age)*85%      Imaging: No results found  Review of Systems:  Review of Systems     REVIEW OF SYSTEMS:  Constitutional:  Denies fever or chills   Eyes:  Denies change in visual acuity   HENT:  Denies nasal congestion or sore throat   Respiratory:  Denies cough or shortness of breath   Cardiovascular:  Denies chest pain or edema   GI:  Denies abdominal pain, nausea, vomiting, bloody stools or diarrhea   :  Denies dysuria, frequency, difficulty in micturition and nocturia  Musculoskeletal:  Denies back pain or joint pain   Neurologic:  Denies headache, focal weakness or sensory changes   Endocrine:  Denies polyuria or polydipsia   Lymphatic:  Denies swollen glands       Physical Exam:    /76   Pulse 60   Ht 5' 10" (1 778 m)   Wt 105 kg (232 lb)   SpO2 97%   BMI 33 29 kg/m²     Physical Exam   PHYSICAL EXAM:  General:  Patient is not in acute distress   Head: Normocephalic, Atraumatic    HEENT:  Both pupils normal-size atraumatic, normocephalic, nonicteric  Neck:  JVP not raised  Trachea central  No carotid bruit  Respiratory:  normal breath sounds no crackles  no rhonchi  Cardiovascular:  Regular rate and rhythm no S3 no murmurs  GI:  Abdomen soft nontender  No organomegaly  Lymphatic:  No cervical or inguinal lymphadenopathy  Neurologic:  Patient is awake alert, oriented   Grossly nonfocal   extremities no edema    Discussion/Summary:   Patient with multiple medical problems who seems to be doing reasonably well from cardiac standpoint  Previous studies reviewed with patient  Medications reviewed and possible side effects discussed  concepts of cardiovascular disease , signs and symptoms of heart disease  Dietary and risk factor modification reinforced  All questions answered  Safety measures reviewed  Patient advised to report any problems prompting medical attention  Risks and benefits  and alternativesof anticoagulation to prevent thromboembolic risk from atrial fibrillation discussed at length  Patient to report any bleeding issues  patient will continue to follow-up with the device Clinic  Medications reviewed  Follow-up in 6 months or earlier as needed  Patient is agreeable with the plan of care

## 2021-06-16 ENCOUNTER — TELEPHONE (OUTPATIENT)
Dept: UROLOGY | Facility: MEDICAL CENTER | Age: 56
End: 2021-06-16

## 2021-06-16 NOTE — TELEPHONE ENCOUNTER
Patient niece called to schedule patient for second opinion  Please advise      Complaint/diagnosis: Cyst on Kidney urine, foamy urine orange brown     Insurance:medicare    History of Cancer:no    Previous Urologist:Dr Smalls    Outside testing/where: no    Records requested/where:Yes Niece will have records faxed to [phone]    Preferred Location: [facility]

## 2021-07-26 DIAGNOSIS — I48.0 PAROXYSMAL A-FIB (HCC): ICD-10-CM

## 2021-07-26 RX ORDER — APIXABAN 5 MG/1
5 TABLET, FILM COATED ORAL 2 TIMES DAILY
Qty: 180 TABLET | Refills: 3 | Status: SHIPPED | OUTPATIENT
Start: 2021-07-26 | End: 2022-02-16 | Stop reason: SDUPTHER

## 2021-09-01 ENCOUNTER — REMOTE DEVICE CLINIC VISIT (OUTPATIENT)
Dept: CARDIOLOGY CLINIC | Facility: CLINIC | Age: 56
End: 2021-09-01
Payer: MEDICARE

## 2021-09-01 DIAGNOSIS — Z95.0 CARDIAC PACEMAKER IN SITU: Primary | ICD-10-CM

## 2021-09-01 PROCEDURE — 93294 REM INTERROG EVL PM/LDLS PM: CPT | Performed by: INTERNAL MEDICINE

## 2021-09-01 PROCEDURE — 93296 REM INTERROG EVL PM/IDS: CPT | Performed by: INTERNAL MEDICINE

## 2021-09-01 NOTE — PROGRESS NOTES
Results for orders placed or performed in visit on 09/01/21   Cardiac EP device report    Narrative    SJM/DUAL CHAMBER PPM (DDDR MODE) - ACTIVE SYSTEM IS MRI CONDITIONAL  [employer] TRANSMISSION: BATTERY VOLTAGE ADEQUATE (9 5-10 YRS)  AP-92%, -6%  ALL AVAILABLE LEAD PARAMETERS WITHIN NORMAL LIMITS  NO SIGNIFICANT HIGH RATE EPISODES  NORMAL DEVICE FUNCTION   GV

## 2021-09-12 ENCOUNTER — TELEPHONE (OUTPATIENT)
Dept: OTHER | Facility: OTHER | Age: 56
End: 2021-09-12

## 2021-09-20 ENCOUNTER — TELEPHONE (OUTPATIENT)
Dept: UROLOGY | Facility: AMBULATORY SURGERY CENTER | Age: 56
End: 2021-09-20

## 2021-09-20 NOTE — TELEPHONE ENCOUNTER
Symptoms as verbalized by patient  Patient saw [facility] [place] in the past  Patient is requesting for consult with our practice  Patient has foamy urine  At times dark   Patient has had urine culture in the past for sepsis  No resent testing with in the last two weeks  Pain: sometimes pain  Nausea/Vomiting: none  Fever/Chills: some time fevers  Bowel Movements:  Urine color: dark brown and foamy  Frequency/Urgencey: patient goes 4 to 5 times a day  Stream: steady during void but drips after       Appt given in our [address] location 10/18/21 7:30am

## 2021-09-20 NOTE — TELEPHONE ENCOUNTER
New patient's wife is calling to say he is having urinary burning and is asking if he can get a urine culture done  Stated he called pcp office and they told him he needs to be seen by urology

## 2021-09-21 NOTE — TELEPHONE ENCOUNTER
Patient's wife called to confirm with have the records from Urology at [facility]   Records are scanned in Media from [place]

## 2021-10-18 ENCOUNTER — TELEPHONE (OUTPATIENT)
Dept: UROLOGY | Facility: MEDICAL CENTER | Age: 56
End: 2021-10-18

## 2021-10-18 ENCOUNTER — OFFICE VISIT (OUTPATIENT)
Dept: UROLOGY | Facility: CLINIC | Age: 56
End: 2021-10-18
Payer: MEDICARE

## 2021-10-18 VITALS
HEART RATE: 64 BPM | BODY MASS INDEX: 33.96 KG/M2 | HEIGHT: 70 IN | DIASTOLIC BLOOD PRESSURE: 82 MMHG | WEIGHT: 237.2 LBS | SYSTOLIC BLOOD PRESSURE: 114 MMHG

## 2021-10-18 DIAGNOSIS — N18.32 STAGE 3B CHRONIC KIDNEY DISEASE (HCC): ICD-10-CM

## 2021-10-18 DIAGNOSIS — Z12.5 SCREENING FOR PROSTATE CANCER: Primary | ICD-10-CM

## 2021-10-18 DIAGNOSIS — N39.0 RECURRENT UTI: ICD-10-CM

## 2021-10-18 DIAGNOSIS — N40.0 BENIGN PROSTATIC HYPERPLASIA WITHOUT LOWER URINARY TRACT SYMPTOMS: ICD-10-CM

## 2021-10-18 PROCEDURE — 99204 OFFICE O/P NEW MOD 45 MIN: CPT | Performed by: PHYSICIAN ASSISTANT

## 2021-10-18 RX ORDER — TAMSULOSIN HYDROCHLORIDE 0.4 MG/1
0.4 CAPSULE ORAL
Qty: 90 CAPSULE | Refills: 3 | Status: SHIPPED | OUTPATIENT
Start: 2021-10-18 | End: 2021-10-18 | Stop reason: SDUPTHER

## 2021-12-09 ENCOUNTER — REMOTE DEVICE CLINIC VISIT (OUTPATIENT)
Dept: CARDIOLOGY CLINIC | Facility: CLINIC | Age: 56
End: 2021-12-09
Payer: MEDICARE

## 2021-12-09 DIAGNOSIS — Z95.0 CARDIAC PACEMAKER IN SITU: Primary | ICD-10-CM

## 2021-12-09 PROCEDURE — 93296 REM INTERROG EVL PM/IDS: CPT | Performed by: INTERNAL MEDICINE

## 2021-12-09 PROCEDURE — 93294 REM INTERROG EVL PM/LDLS PM: CPT | Performed by: INTERNAL MEDICINE

## 2022-01-04 ENCOUNTER — TELEPHONE (OUTPATIENT)
Dept: UROLOGY | Facility: CLINIC | Age: 57
End: 2022-01-04

## 2022-01-04 NOTE — TELEPHONE ENCOUNTER
Called and made patient aware of results  ----- Message from Gordo Galarza PA-C sent at 1/4/2022  8:38 AM EST -----  Urine testing positive for infection  Antibiotic sent to pharmacy

## 2022-01-06 ENCOUNTER — PROCEDURE VISIT (OUTPATIENT)
Dept: UROLOGY | Facility: CLINIC | Age: 57
End: 2022-01-06
Payer: MEDICARE

## 2022-01-06 VITALS
SYSTOLIC BLOOD PRESSURE: 104 MMHG | DIASTOLIC BLOOD PRESSURE: 74 MMHG | WEIGHT: 240 LBS | BODY MASS INDEX: 34.36 KG/M2 | HEIGHT: 70 IN | HEART RATE: 59 BPM

## 2022-01-06 DIAGNOSIS — N40.0 BENIGN PROSTATIC HYPERPLASIA WITHOUT LOWER URINARY TRACT SYMPTOMS: ICD-10-CM

## 2022-01-06 DIAGNOSIS — N39.0 RECURRENT UTI: Primary | ICD-10-CM

## 2022-01-06 LAB — POST-VOID RESIDUAL VOLUME, ML POC: 58 ML

## 2022-01-06 PROCEDURE — 52000 CYSTOURETHROSCOPY: CPT | Performed by: UROLOGY

## 2022-01-06 PROCEDURE — 51798 US URINE CAPACITY MEASURE: CPT | Performed by: UROLOGY

## 2022-01-06 RX ORDER — LISINOPRIL 2.5 MG/1
2.5 TABLET ORAL DAILY
COMMUNITY
Start: 2021-12-26

## 2022-01-06 NOTE — PROGRESS NOTES
Cystoscopy     Date/Time 1/6/2022 7:42 AM     Performed by  Rosa M Lanier MD     Authorized by Rosa M Lanier MD          Procedure Details:  Procedure type: cystoscopy      Office Cystoscopy Procedure Note    Indication:    Intermittent lower urinary tract symptoms    Informed consent   The risks, benefits, complications, treatment options, and expected outcomes were discussed with the patient  The patient concurred with the proposed plan and provided informed consent  Anesthesia  Lidocaine jelly 2%    Antibiotic prophylaxis   None    Procedure  The patient was placed in the supineposition, was prepped and draped in the usual manner using sterile technique, and 2% lidocaine jelly instilled into the urethra  A 17 F flexible cystoscope was then inserted into the urethra and the urethra and bladder carefully examined  The following findings were noted:    Findings:  Urethra:  Normal  Prostate:  Normal  Bladder:  Normal  Ureteral orifices:  Normal  Other findings:  None     Specimens: None                 Complications:    None; patient tolerated the procedure well           Disposition: To home after 30 minute observation  Condition: Stable    Plan: In cystoscopy was completely normal   Patient is on bothered by his lower urinary tract symptoms  He will follow-up as needed

## 2022-02-16 ENCOUNTER — OFFICE VISIT (OUTPATIENT)
Dept: CARDIOLOGY CLINIC | Facility: CLINIC | Age: 57
End: 2022-02-16
Payer: MEDICARE

## 2022-02-16 ENCOUNTER — TELEPHONE (OUTPATIENT)
Dept: CARDIOLOGY CLINIC | Facility: CLINIC | Age: 57
End: 2022-02-16

## 2022-02-16 VITALS
WEIGHT: 242.3 LBS | OXYGEN SATURATION: 97 % | BODY MASS INDEX: 34.69 KG/M2 | HEIGHT: 70 IN | HEART RATE: 60 BPM | SYSTOLIC BLOOD PRESSURE: 110 MMHG | DIASTOLIC BLOOD PRESSURE: 74 MMHG

## 2022-02-16 DIAGNOSIS — E78.2 COMBINED HYPERLIPIDEMIA: ICD-10-CM

## 2022-02-16 DIAGNOSIS — Z79.01 CHRONIC ANTICOAGULATION: Primary | ICD-10-CM

## 2022-02-16 DIAGNOSIS — I48.0 PAROXYSMAL A-FIB (HCC): ICD-10-CM

## 2022-02-16 DIAGNOSIS — I10 HYPERTENSION, ESSENTIAL: ICD-10-CM

## 2022-02-16 DIAGNOSIS — I71.03 DISSECTION OF THORACOABDOMINAL AORTA (HCC): ICD-10-CM

## 2022-02-16 PROCEDURE — 99214 OFFICE O/P EST MOD 30 MIN: CPT | Performed by: INTERNAL MEDICINE

## 2022-02-16 RX ORDER — METOPROLOL SUCCINATE 50 MG/1
75 TABLET, EXTENDED RELEASE ORAL DAILY
Qty: 135 TABLET | Refills: 3 | Status: SHIPPED | OUTPATIENT
Start: 2022-02-16

## 2022-02-16 RX ORDER — APIXABAN 5 MG/1
5 TABLET, FILM COATED ORAL 2 TIMES DAILY
Qty: 180 TABLET | Refills: 3 | Status: SHIPPED | OUTPATIENT
Start: 2022-02-16

## 2022-02-16 RX ORDER — DIGOXIN 125 MCG
TABLET ORAL
Qty: 45 TABLET | Refills: 3 | Status: SHIPPED | OUTPATIENT
Start: 2022-02-16

## 2022-02-16 RX ORDER — SIMVASTATIN 20 MG
20 TABLET ORAL
Qty: 90 TABLET | Refills: 3 | Status: SHIPPED | OUTPATIENT
Start: 2022-02-16

## 2022-02-16 NOTE — TELEPHONE ENCOUNTER
Pt is olivia for the CT scan on 2/24/22 & pt's niece wants to know if pt can take his meds prior to the test

## 2022-02-16 NOTE — PROGRESS NOTES
[facility]  [address] [id number]  Cardiology Follow Up    Sadi Moreno Sharon  1965  [id number]      1  Chronic anticoagulation     2  Hypertension, essential  metoprolol succinate (TOPROL-XL) 50 mg 24 hr tablet   3  Paroxysmal A-fib (HCC)  Eliquis 5 MG    digoxin (Digitek) 0 125 mg tablet   4  Combined hyperlipidemia  simvastatin (ZOCOR) 20 mg tablet       Chief Complaint   Patient presents with    Follow-up     Routine follow up       Interval History:  Patient presents for follow-up visit  Patient has occasional palpitations related to his paroxysmal atrial fibrillation  No chest pain no shortness of breath  Patient had surgery for aortic dissection many years ago  Patient also has history of sick sinus syndrome status post pacemaker  Patient is on Eliquis for anticoagulation  Patient denies any bleeding issues    He has not had any CT imaging studies in the recent past     Patient Active Problem List   Diagnosis    Paroxysmal A-fib ([facility] )    Hypertension, essential    Chronic anticoagulation    Chest discomfort    Dyspnea on effort    Exhaustion of cardiac pacemaker battery    Sick sinus syndrome (HCC)    Stage 3b chronic kidney disease (HCC)     Past Medical History:   Diagnosis Date    Abdominal aortic aneurysm (AAA) (HCC)     Abnormal ECG     Atrial fibrillation (HCC)     Cardiac arrhythmia     Chest pain     Coronary atherosclerosis of native coronary artery     Hyperlipidemia     Hypertension     Myocardial infarction ([address] )     Sinoatrial node dysfunction (HCC)     SOB (shortness of breath)      Social History     Socioeconomic History    Marital status: Single     Spouse name: Not on file    Number of children: Not on file    Years of education: Not on file    Highest education level: Not on file   Occupational History    Not on file   Tobacco Use    Smoking status: Never Smoker    Smokeless tobacco: Never Used   Vaping Use    Vaping Use: Never used   Substance and Sexual Activity    Alcohol use: No    Drug use: Never    Sexual activity: Not on file   Other Topics Concern    Not on file   Social History Narrative    Not on file     Social Determinants of Health     Financial Resource Strain: Not on file   Food Insecurity: Not on file   Transportation Needs: Not on file   Physical Activity: Not on file   Stress: Not on file   Social Connections: Not on file   Intimate Partner Violence: Not on file   Housing Stability: Not on file      No family history on file    Past Surgical History:   Procedure Laterality Date    CARDIAC CATHETERIZATION      INSERT / REPLACE / REMOVE PACEMAKER      PELVIC AND PARA-AORTIC LYMPH NODE DISSECTION         Current Outpatient Medications:     amLODIPine (NORVASC) 5 mg tablet, Take 1 tablet (5 mg total) by mouth daily, Disp: 90 tablet, Rfl: 3    Cholecalciferol 50 MCG (2000 UT) CAPS, Take 2 capsules by mouth, Disp: , Rfl:     Cyclobenzaprine HCl (FLEXERIL PO), 1 tablet at bedtime, Disp: , Rfl:     digoxin (Digitek) 0 125 mg tablet, 1 tab every other day, Disp: 45 tablet, Rfl: 3    Eliquis 5 MG, Take 1 tablet (5 mg total) by mouth 2 (two) times a day, Disp: 180 tablet, Rfl: 3    FLUoxetine (PROzac) 10 mg capsule, 10 mg daily , Disp: , Rfl: 0    hydrOXYzine HCL (ATARAX) 50 mg tablet, Take 1 tablet by mouth, Disp: , Rfl:     lisinopril (ZESTRIL) 2 5 mg tablet, Take 2 5 mg by mouth daily, Disp: , Rfl:     metoprolol succinate (TOPROL-XL) 50 mg 24 hr tablet, Take 1 5 tablets (75 mg total) by mouth daily, Disp: 135 tablet, Rfl: 3    omeprazole (PriLOSEC) 20 mg delayed release capsule, Take 20 mg by mouth daily, Disp: , Rfl: 0    ROZEREM 8 MG tablet, 8 mg daily at bedtime , Disp: , Rfl: 0    simvastatin (ZOCOR) 20 mg tablet, Take 1 tablet (20 mg total) by mouth daily at bedtime, Disp: 90 tablet, Rfl: 3    tamsulosin (FLOMAX) 0 4 mg, Take 1 capsule (0 4 mg total) by mouth daily with dinner, Disp: 90 capsule, Rfl: 3    ULORIC 40 MG tablet, Take 40 mg by mouth daily, Disp: , Rfl: 0    ziprasidone (GEODON) 40 mg capsule, 40 mg daily at bedtime , Disp: , Rfl: 0    divalproex sodium (DEPAKOTE) 500 mg EC tablet, 1 TABLET DAILY (Patient not taking: Reported on 10/18/2021), Disp: , Rfl:     fluticasone (FLONASE) 50 mcg/act nasal spray, into each nostril (Patient not taking: Reported on 10/18/2021 ), Disp: , Rfl:     lisinopril (ZESTRIL) 5 mg tablet, Take 1 tablet (5 mg total) by mouth daily, Disp: 90 tablet, Rfl: 3  No Known Allergies    Labs:  Procedure visit on 01/06/2022   Component Date Value    POST-VOID RESIDUAL VOLUM* 01/06/2022 58      Imaging: No results found  Review of Systems:  Review of Systems   REVIEW OF SYSTEMS:  Constitutional:  Denies fever or chills   Eyes:  Denies change in visual acuity   HENT:  Denies nasal congestion or sore throat   Respiratory:  shortness of breath   Cardiovascular:  Denies chest pain or edema   GI:  Denies abdominal pain, nausea, vomiting, bloody stools or diarrhea   :  Denies dysuria, frequency, difficulty in micturition and nocturia  Musculoskeletal:  Denies back pain or joint pain   Neurologic:  Denies headache, focal weakness or sensory changes   Endocrine:  Denies polyuria or polydipsia   Lymphatic:  Denies swollen glands   Psychiatric:  Denies depression or anxiety     Physical Exam:    /74 (BP Location: Left arm, Patient Position: Sitting, Cuff Size: Large)   Pulse 60   Ht 5' 10" (1 778 m)   Wt 110 kg (242 lb 4 8 oz)   SpO2 97%   BMI 34 77 kg/m²     Physical Exam   PHYSICAL EXAM:  General:  Patient is not in acute distress   Head: Normocephalic, Atraumatic  HEENT:  Both pupils normal-size atraumatic, normocephalic, nonicteric  Neck:  JVP not raised  Trachea central  No carotid bruit  Respiratory:  normal breath sounds no crackles  no rhonchi  Cardiovascular:  Regular rate and rhythm no S3 no murmurs  GI:  Abdomen soft nontender   No organomegaly  Lymphatic:  No cervical or inguinal lymphadenopathy  Neurologic:  Patient is awake alert, oriented   Grossly nonfocal  Extremities no edema    Recent pacemaker interrogation data reviewed  Normally functioning device  Battery status is good  Discussed with patient and family  Discussion/Summary:  Patient with multiple medical problems who seems to be doing reasonably well from cardiac standpoint  Previous studies reviewed with patient  Medications reviewed and possible side effects discussed  concepts of cardiovascular disease , signs and symptoms of heart disease  Dietary and risk factor modification reinforced  All questions answered  Safety measures reviewed  Patient advised to report any problems prompting medical attention  Risks and benefits  and alternativesof anticoagulation to prevent thromboembolic risk from atrial fibrillation discussed at length  Patient to report any bleeding issues  Patient will continue to follow up with pacemaker clinic  Importance of salt restriction reinforced  Patient will be scheduled for CT of the chest and abdomen without contrast   Will avoid contrast given history of CKD  Patient has history of thoracoabdominal dissection status post surgery many years ago  Follow-up in 6 months or earlier as needed  Prescriptions reviewed and refilled  Patient is agreeable with the plan of care    Of

## 2022-02-18 ENCOUNTER — TELEPHONE (OUTPATIENT)
Dept: CARDIOLOGY CLINIC | Facility: CLINIC | Age: 57
End: 2022-02-18

## 2022-02-18 NOTE — TELEPHONE ENCOUNTER
Name of Caller:Patient's wife    Problem/Symptoms:  Kike Giles is scheduled to have an ultrasound done next Thursday February 24th  She would like to know if he can still take his medications and if he is able to eat before the ultrasound      Call back number:[phone]    Last or Next appt:

## 2022-02-18 NOTE — TELEPHONE ENCOUNTER
Is patient able to eat prior to test?     Questions about medications addressed in another telephone task and niece was unable to be reached

## 2022-02-22 ENCOUNTER — NURSE TRIAGE (OUTPATIENT)
Dept: OTHER | Facility: OTHER | Age: 57
End: 2022-02-22

## 2022-02-22 DIAGNOSIS — N39.0 RECURRENT UTI: Primary | ICD-10-CM

## 2022-02-22 NOTE — TELEPHONE ENCOUNTER
Patient's wife returning call  Stated she stepped out for a moment but she is back now  Please reach out possible urinary infection

## 2022-02-22 NOTE — TELEPHONE ENCOUNTER
Of note patient had a standing urine culture order in place  New orders placed for UA and urine culture  Please advise patient he can pick these up, they can be emailed to him, or he can provide fax number and they can then be faxed  Office to monitor for results as available

## 2022-02-22 NOTE — TELEPHONE ENCOUNTER
Regarding: Possible Infection  ----- Message from [facility] sent at 2/22/2022  7:42 AM EST -----  "He has foam in his urine "

## 2022-02-22 NOTE — TELEPHONE ENCOUNTER
Patient would like a urine test ordered and sent to [facility]  Please call to advise

## 2022-02-22 NOTE — TELEPHONE ENCOUNTER
Reason for Disposition   All other urine symptoms    Answer Assessment - Initial Assessment Questions  1  SYMPTOM: "What's the main symptom you're concerned about?" (e g , frequency, incontinence)      Foamy urine, pain/burning with urine   2  ONSET: "When did the symptoms start?"      Ongoing  3  PAIN: "Is there any pain?" If Yes, ask: "How bad is it?" (Scale: 1-10; mild, moderate, severe)      6/10  4  CAUSE: "What do you think is causing the symptoms?"      UTI  5  OTHER SYMPTOMS: "Do you have any other symptoms?" (e g , fever, flank pain, blood in urine, pain with urination)      Pain with urination   6   PREGNANCY: "Is there any chance you are pregnant?" "When was your last menstrual period?"      N/A    Protocols used: [facility]

## 2022-02-23 NOTE — TELEPHONE ENCOUNTER
refaxed order to lab as patient called in and stated they dont have order for culture   It was faxed to [phone]

## 2022-02-24 ENCOUNTER — HOSPITAL ENCOUNTER (OUTPATIENT)
Dept: CT IMAGING | Facility: CLINIC | Age: 57
Discharge: HOME | End: 2022-02-24
Payer: MEDICARE

## 2022-02-24 DIAGNOSIS — I71.03 DISSECTION OF THORACOABDOMINAL AORTA (HCC): ICD-10-CM

## 2022-02-24 PROCEDURE — G1004 CDSM NDSC: HCPCS

## 2022-02-24 PROCEDURE — 71250 CT THORAX DX C-: CPT

## 2022-02-24 PROCEDURE — 74176 CT ABD & PELVIS W/O CONTRAST: CPT

## 2022-02-28 ENCOUNTER — TELEPHONE (OUTPATIENT)
Dept: UROLOGY | Facility: MEDICAL CENTER | Age: 57
End: 2022-02-28

## 2022-02-28 NOTE — TELEPHONE ENCOUNTER
Patient of Dr Quay Brunner in [facility]    Please see nurse triage (healthcall message)  Not able to documment on encounter  Patient's wife called stating patient did urine test and urine culture at [facility] labs  results are in care everywhere and wants to know the results  She wants to know if he needs antibiotics

## 2022-03-01 NOTE — TELEPHONE ENCOUNTER
Called and spoke to patient and wife  Informed patient that urine culture was negative and no antibiotic needed at this time  Patient and wife verbalized understanding

## 2022-03-24 ENCOUNTER — REMOTE DEVICE CLINIC VISIT (OUTPATIENT)
Dept: CARDIOLOGY CLINIC | Facility: CLINIC | Age: 57
End: 2022-03-24
Payer: MEDICARE

## 2022-03-24 DIAGNOSIS — Z95.0 PRESENCE OF PERMANENT CARDIAC PACEMAKER: Primary | ICD-10-CM

## 2022-03-24 PROCEDURE — 93296 REM INTERROG EVL PM/IDS: CPT | Performed by: INTERNAL MEDICINE

## 2022-03-24 PROCEDURE — 93294 REM INTERROG EVL PM/LDLS PM: CPT | Performed by: INTERNAL MEDICINE

## 2022-03-24 NOTE — PROGRESS NOTES
Results for orders placed or performed in visit on 03/24/22   Cardiac EP device report    Narrative    SJM/DUAL CHAMBER PPM (DDDR MODE) - ACTIVE SYSTEM IS MRI CONDITIONAL  MERLIN TRANSMISSION: BATTERY VOLTAGE ADEQUATE  (10 2 YRS)  AP 93%  1%  ALL AVAILABLE LEAD PARAMETERS WITHIN NORMAL LIMITS  NO SIGNIFICANT HIGH RATE EPISODES  NORMAL DEVICE FUNCTION  ---DOMINGUEZ

## 2022-05-09 ENCOUNTER — TELEPHONE (OUTPATIENT)
Dept: UROLOGY | Facility: AMBULATORY SURGERY CENTER | Age: 57
End: 2022-05-09

## 2022-05-09 NOTE — TELEPHONE ENCOUNTER
Attempted to reach patient at [phone]    A voice message was left asking patient to return office call

## 2022-05-09 NOTE — TELEPHONE ENCOUNTER
Patient initially seen by Shalonda Wylie and then seen by JOSELUIS for cystoscopy which was completely normal  He was advised on prn f/u  Recommend urine testing  Duanne Males foamy urine is likely secondary to history chronic kidney disease  Recommend avoiding bladder irritants and avoiding constipation  If negative urine testing and ongoing dysuria, would recommend f/u

## 2022-05-09 NOTE — TELEPHONE ENCOUNTER
Pt's niece called back with fax number for the script to be sent to [facility] fax number is [phone]

## 2022-05-09 NOTE — TELEPHONE ENCOUNTER
PT under care of:  [facility]     Pt last seen: 03/10/22     PT calling today because/symptoms are: pt's wife called and stated that the pt has brown urine and foaming and is burning when he is urinating  This has been ongoing things off and on for a while now        PT can be reached at: [phone]

## 2022-05-09 NOTE — TELEPHONE ENCOUNTER
Pts care is managed by the [place] office  Pt was last seen 10/18/21    Pts minh calling reporting since 1/6/22 after cystoscopy pt has been experiencing brown foamy urine on and off  Pt with burning while urinating at a # 7 daily  Pt has denied blood clots or blood in his urine  Pts is well hydrated drinking up to 6 bottles of water daily  Pt is continent of urine with no other symptoms at this time  Urine testing has been ordered for pt to have to rule out a urinary tract infection  Informed pts niece a provider will be made aware of above symptoms and urology will call back should there be any other recommendations for pt at this time  Please be advised

## 2022-05-11 NOTE — TELEPHONE ENCOUNTER
Called and left message for Caty Camarena to call our office back to clarify   Office number left in VM

## 2022-05-12 NOTE — TELEPHONE ENCOUNTER
Pt's niece called to see if the whole practice was moving to [facility]  Informed her that only Dr Jennie Villeda

## 2022-05-26 ENCOUNTER — IN-CLINIC DEVICE VISIT (OUTPATIENT)
Dept: CARDIOLOGY CLINIC | Facility: CLINIC | Age: 57
End: 2022-05-26
Payer: MEDICARE

## 2022-05-26 DIAGNOSIS — Z95.0 PRESENCE OF PERMANENT CARDIAC PACEMAKER: Primary | ICD-10-CM

## 2022-05-26 PROCEDURE — 93280 PM DEVICE PROGR EVAL DUAL: CPT | Performed by: INTERNAL MEDICINE

## 2022-05-26 NOTE — PROGRESS NOTES
SJM/DUAL CHAMBER PPM (DDDR MODE) - ACTIVE SYSTEM IS MRI CONDITIONAL   DEVICE INTERROGATED IN THE [place] OFFICE: BATTERY VOLTAGE ADEQUATE (10 2 YR )   AP 93%  <1%    ALL LEAD PARAMETERS WITHIN NORMAL LIMITS   NO SIGNIFICANT HIGH RATE EPISODES   NO PROGRAMMING CHANGES MADE TO DEVICE PARAMETERS   NORMAL DEVICE FUNCTION   RG

## 2022-07-10 ENCOUNTER — TELEPHONE (OUTPATIENT)
Dept: OTHER | Facility: OTHER | Age: 57
End: 2022-07-10

## 2022-07-10 NOTE — TELEPHONE ENCOUNTER
Niece Susu Hughes would like the office to know that her uncle is no longer living with her  At this time she does not know were his whereabouts are

## 2022-08-02 DIAGNOSIS — N40.0 BENIGN PROSTATIC HYPERPLASIA WITHOUT LOWER URINARY TRACT SYMPTOMS: ICD-10-CM

## 2022-08-02 RX ORDER — TAMSULOSIN HYDROCHLORIDE 0.4 MG/1
0.4 CAPSULE ORAL
Qty: 90 CAPSULE | Refills: 3 | Status: SHIPPED | OUTPATIENT
Start: 2022-08-02 | End: 2022-09-08 | Stop reason: SDUPTHER

## 2022-08-02 NOTE — TELEPHONE ENCOUNTER
Medication Refill  Patient is calling to request a prescription refill for:  tamsulosin (FLOMAX) 0 4 mg  30 / 90 day supply: 90 day  Pharmacy: [facility] [address] [place]   Pt can be reached at:  [phone]

## 2022-09-08 DIAGNOSIS — N40.0 BENIGN PROSTATIC HYPERPLASIA WITHOUT LOWER URINARY TRACT SYMPTOMS: ICD-10-CM

## 2022-09-08 RX ORDER — TAMSULOSIN HYDROCHLORIDE 0.4 MG/1
0.4 CAPSULE ORAL
Qty: 90 CAPSULE | Refills: 3 | Status: SHIPPED | OUTPATIENT
Start: 2022-09-08

## 2022-09-08 NOTE — TELEPHONE ENCOUNTER
Patient's niece calling that she spoke to [facility] on [address] in [facility] and they did not received the prescription for tamsulosin 0 4 mg that was sent on 8/2/22  Patient stated the pharmacy is requesting it be resent      Please advise    Patient's niece  Is requesting a call back once it is resent at [phone]

## 2022-09-08 NOTE — TELEPHONE ENCOUNTER
Called and left vm for Alfred Alexey that script was resent as requested  Office number left in voicemail for any other concerns

## 2022-09-19 ENCOUNTER — REMOTE DEVICE CLINIC VISIT (OUTPATIENT)
Dept: CARDIOLOGY CLINIC | Facility: CLINIC | Age: 57
End: 2022-09-19
Payer: MEDICARE

## 2022-09-19 DIAGNOSIS — Z95.0 PRESENCE OF PERMANENT CARDIAC PACEMAKER: Primary | ICD-10-CM

## 2022-09-19 PROCEDURE — 93296 REM INTERROG EVL PM/IDS: CPT | Performed by: INTERNAL MEDICINE

## 2022-09-19 PROCEDURE — 93294 REM INTERROG EVL PM/LDLS PM: CPT | Performed by: INTERNAL MEDICINE

## 2022-09-19 NOTE — PROGRESS NOTES
SJM DUAL CHAMBER PPM /ACTIVE SYSTEM IS MRI CONDITIONAL   MERLIN TRANSMISSION:  BATTERY VOLTAGE ADEQUATE (8 6-9 1 YR )   AP 94%  <1%    ALL LEAD PARAMETERS WITHIN NORMAL LIMITS   NO SIGNIFICANT HIGH RATE EPISODES   NORMAL DEVICE FUNCTION  Maci Lockhart

## 2022-09-26 ENCOUNTER — OFFICE VISIT (OUTPATIENT)
Dept: CARDIOLOGY CLINIC | Facility: CLINIC | Age: 57
End: 2022-09-26
Payer: MEDICARE

## 2022-09-26 VITALS
OXYGEN SATURATION: 98 % | RESPIRATION RATE: 16 BRPM | SYSTOLIC BLOOD PRESSURE: 110 MMHG | HEART RATE: 60 BPM | DIASTOLIC BLOOD PRESSURE: 74 MMHG | WEIGHT: 238 LBS | BODY MASS INDEX: 34.07 KG/M2 | HEIGHT: 70 IN

## 2022-09-26 DIAGNOSIS — I48.0 PAROXYSMAL A-FIB (HCC): ICD-10-CM

## 2022-09-26 DIAGNOSIS — N18.32 STAGE 3B CHRONIC KIDNEY DISEASE (HCC): ICD-10-CM

## 2022-09-26 DIAGNOSIS — I10 HYPERTENSION, ESSENTIAL: ICD-10-CM

## 2022-09-26 DIAGNOSIS — E78.2 COMBINED HYPERLIPIDEMIA: ICD-10-CM

## 2022-09-26 DIAGNOSIS — I71.03 DISSECTION OF THORACOABDOMINAL AORTA (HCC): Primary | ICD-10-CM

## 2022-09-26 DIAGNOSIS — Z79.01 CHRONIC ANTICOAGULATION: ICD-10-CM

## 2022-09-26 PROCEDURE — 99214 OFFICE O/P EST MOD 30 MIN: CPT | Performed by: INTERNAL MEDICINE

## 2022-09-26 RX ORDER — APIXABAN 5 MG/1
5 TABLET, FILM COATED ORAL 2 TIMES DAILY
Qty: 180 TABLET | Refills: 3 | Status: SHIPPED | OUTPATIENT
Start: 2022-09-26

## 2022-09-26 RX ORDER — SIMVASTATIN 20 MG
20 TABLET ORAL
Qty: 90 TABLET | Refills: 3 | Status: SHIPPED | OUTPATIENT
Start: 2022-09-26

## 2022-09-26 RX ORDER — DIGOXIN 125 MCG
TABLET ORAL
Qty: 90 TABLET | Refills: 3 | Status: SHIPPED | OUTPATIENT
Start: 2022-09-26

## 2022-09-26 RX ORDER — METOPROLOL SUCCINATE 50 MG/1
75 TABLET, EXTENDED RELEASE ORAL DAILY
Qty: 135 TABLET | Refills: 3 | Status: SHIPPED | OUTPATIENT
Start: 2022-09-26

## 2022-09-26 NOTE — PROGRESS NOTES
[facility] [place]  [address]  [address] [phone]  Cardiology Follow Up    Charan Jarvis Learn  1965  [id number]      1  Dissection of thoracoabdominal aorta ([facility] )     2  Hypertension, essential  metoprolol succinate (TOPROL-XL) 50 mg 24 hr tablet   3  Paroxysmal A-fib (HCC)  Eliquis 5 MG    digoxin (Digitek) 0 125 mg tablet   4  Combined hyperlipidemia  simvastatin (ZOCOR) 20 mg tablet   5  Stage 3b chronic kidney disease ([place] [address] )     6  Chronic anticoagulation         Chief Complaint   Patient presents with    Follow-up       Interval History:  Patient presents for follow-up visit  Patient denies any history of chest pain shortness of breath  Patient denies any history of leg edema or orthopnea PND  No history of presyncope syncope  Patient states compliance with the present list of medications  Patient has episodes of occasional palpitations related to his paroxysmal atrial fibrillation  Patient denies any bleeding issues  He is regularly followed by the pacemaker clinic      Patient Active Problem List   Diagnosis    Paroxysmal A-fib ([place] [address] )    Hypertension, essential    Chronic anticoagulation    Chest discomfort    Dyspnea on effort    Exhaustion of cardiac pacemaker battery    Sick sinus syndrome (HCC)    Stage 3b chronic kidney disease ([place] [address] )     Past Medical History:   Diagnosis Date    Abdominal aortic aneurysm (AAA) (HCC)     Abnormal ECG     Atrial fibrillation (HCC)     Cardiac arrhythmia     Chest pain     Coronary atherosclerosis of native coronary artery     Hyperlipidemia     Hypertension     Myocardial infarction ([facility] )     Sinoatrial node dysfunction (HCC)     SOB (shortness of breath)      Social History     Socioeconomic History    Marital status: Single     Spouse name: Not on file    Number of children: Not on file    Years of education: Not on file    Highest education level: Not on file   Occupational History    Not on file Tobacco Use    Smoking status: Never Smoker    Smokeless tobacco: Never Used   Vaping Use    Vaping Use: Never used   Substance and Sexual Activity    Alcohol use: No    Drug use: Never    Sexual activity: Not on file   Other Topics Concern    Not on file   Social History Narrative    Not on file     Social Determinants of Health     Financial Resource Strain: Not on file   Food Insecurity: Not on file   Transportation Needs: Not on file   Physical Activity: Not on file   Stress: Not on file   Social Connections: Not on file   Intimate Partner Violence: Not on file   Housing Stability: Not on file      History reviewed  No pertinent family history    Past Surgical History:   Procedure Laterality Date    CARDIAC CATHETERIZATION      INSERT / REPLACE / REMOVE PACEMAKER      PELVIC AND PARA-AORTIC LYMPH NODE DISSECTION         Current Outpatient Medications:     amLODIPine (NORVASC) 5 mg tablet, Take 1 tablet (5 mg total) by mouth daily, Disp: 90 tablet, Rfl: 3    Cholecalciferol 50 MCG (2000 UT) CAPS, Take 2 capsules by mouth, Disp: , Rfl:     Cyclobenzaprine HCl (FLEXERIL PO), 1 tablet at bedtime, Disp: , Rfl:     digoxin (Digitek) 0 125 mg tablet, 1 tab every other day, Disp: 90 tablet, Rfl: 3    Eliquis 5 MG, Take 1 tablet (5 mg total) by mouth 2 (two) times a day, Disp: 180 tablet, Rfl: 3    FLUoxetine (PROzac) 10 mg capsule, 10 mg daily , Disp: , Rfl: 0    hydrOXYzine HCL (ATARAX) 50 mg tablet, Take 1 tablet by mouth, Disp: , Rfl:     lisinopril (ZESTRIL) 2 5 mg tablet, Take 2 5 mg by mouth daily, Disp: , Rfl:     lisinopril (ZESTRIL) 5 mg tablet, Take 1 tablet (5 mg total) by mouth daily, Disp: 90 tablet, Rfl: 3    metoprolol succinate (TOPROL-XL) 50 mg 24 hr tablet, Take 1 5 tablets (75 mg total) by mouth daily, Disp: 135 tablet, Rfl: 3    omeprazole (PriLOSEC) 20 mg delayed release capsule, Take 20 mg by mouth daily, Disp: , Rfl: 0    ROZEREM 8 MG tablet, 8 mg daily at bedtime , Disp: , Rfl: 0    simvastatin (ZOCOR) 20 mg tablet, Take 1 tablet (20 mg total) by mouth daily at bedtime, Disp: 90 tablet, Rfl: 3    tamsulosin (FLOMAX) 0 4 mg, Take 1 capsule (0 4 mg total) by mouth daily with dinner, Disp: 90 capsule, Rfl: 3    ULORIC 40 MG tablet, Take 40 mg by mouth daily, Disp: , Rfl: 0    ziprasidone (GEODON) 40 mg capsule, 40 mg daily at bedtime , Disp: , Rfl: 0  No Known Allergies    Labs:  No visits with results within 2 Month(s) from this visit  Latest known visit with results is:   Procedure visit on 01/06/2022   Component Date Value    POST-VOID RESIDUAL VOLUM* 01/06/2022 58      Imaging: Cardiac EP device report    Result Date: 9/19/2022  Narrative: MEMO DUAL CHAMBER PPM /ACTIVE SYSTEM IS MRI CONDITIONAL MERLIN TRANSMISSION:  BATTERY VOLTAGE ADEQUATE (8 6-9 1 YR )  AP 94%  <1%  ALL LEAD PARAMETERS WITHIN NORMAL LIMITS  NO SIGNIFICANT HIGH RATE EPISODES  NORMAL DEVICE FUNCTION  RG       Review of Systems:  Review of Systems   REVIEW OF SYSTEMS:  Constitutional:  Denies fever or chills   Eyes:  Denies change in visual acuity   HENT:  Denies nasal congestion or sore throat   Respiratory:  Denies cough or shortness of breath   Cardiovascular:  Denies chest pain or edema   GI:  Denies abdominal pain, nausea, vomiting, bloody stools or diarrhea   :  Denies dysuria, frequency, difficulty in micturition and nocturia  Musculoskeletal:  Denies back pain or joint pain   Neurologic:  Denies headache, focal weakness or sensory changes   Endocrine:  Denies polyuria or polydipsia   Lymphatic:  Denies swollen glands   Psychiatric:  Denies depression or anxiety     Physical Exam:    /74 (BP Location: Left arm, Patient Position: Sitting, Cuff Size: Standard)   Pulse 60   Resp 16   Ht 5' 10" (1 778 m)   Wt 108 kg (238 lb)   SpO2 98%   BMI 34 15 kg/m²     Physical Exam   PHYSICAL EXAM:  General:  Patient is not in acute distress   Head: Normocephalic, Atraumatic    HEENT:  Both pupils normal-size atraumatic, normocephalic, nonicteric  Neck:  JVP not raised  Trachea central  No carotid bruit  Respiratory:  normal breath sounds no crackles  no rhonchi  Cardiovascular:  Regular rate and rhythm no S3 no murmurs  GI:  Abdomen soft nontender  No organomegaly  Lymphatic:  No cervical or inguinal lymphadenopathy  Neurologic:  Patient is awake alert, oriented   Grossly nonfocal  Extremities no edema    Pacemaker interrogation data reviewed  Normally functioning device  Paroxysmal atrial fibrillation noted  Patient is on beta-blockers as well as anticoagulation and digoxin  Discussion/Summary:  Patient with multiple medical problems who seems to be doing reasonably well from cardiac standpoint  Previous studies reviewed with patient  Medications reviewed and possible side effects discussed  concepts of cardiovascular disease , signs and symptoms of heart disease  Dietary and risk factor modification reinforced  All questions answered  Safety measures reviewed  Patient advised to report any problems prompting medical attention  Risks and benefits  and alternativesof anticoagulation to prevent thromboembolic risk from atrial fibrillation discussed at length  Patient to report any bleeding issues  Patient will continue to follow up with pacemaker clinic  Patient will follow-up with primary care physician  Followup in 6 months or earlier as needed  Patient is agreeable the plan of care

## 2022-10-18 ENCOUNTER — TELEPHONE (OUTPATIENT)
Dept: UROLOGY | Facility: CLINIC | Age: 57
End: 2022-10-18

## 2023-03-21 ENCOUNTER — REMOTE DEVICE CLINIC VISIT (OUTPATIENT)
Dept: CARDIOLOGY CLINIC | Facility: CLINIC | Age: 58
End: 2023-03-21

## 2023-03-21 DIAGNOSIS — Z95.0 PRESENCE OF PERMANENT CARDIAC PACEMAKER: Primary | ICD-10-CM

## 2023-03-21 NOTE — PROGRESS NOTES
SJM DUAL CHAMBER PPM /NOT MRI CONDITIONAL   MERLIN TRANSMISSION:  BATTERY VOLTAGE ADEQUATE (7 9-8 2 YR )   AP 94%  <1%    ALL LEAD PARAMETERS WITHIN NORMAL LIMITS   3 AT/AF EPISODES WITH LONGEST 11 HR  20 M   PATIENT TAKES ELIQUIS AND METOPROLOL   NORMAL DEVICE FUNCTION   RG

## 2023-06-01 ENCOUNTER — IN-CLINIC DEVICE VISIT (OUTPATIENT)
Dept: CARDIOLOGY CLINIC | Facility: CLINIC | Age: 58
End: 2023-06-01

## 2023-06-01 DIAGNOSIS — Z95.0 PRESENCE OF PERMANENT CARDIAC PACEMAKER: Primary | ICD-10-CM

## 2023-06-01 NOTE — PROGRESS NOTES
SJM DUAL CHAMBER PPM /NOT MRI CONDITIONAL   DEVICE INTERROGATED IN THE [place] OFFICE:  BATTERY VOLTAGE ADEQUATE (7 9-8 2 YR )   AP 95%  <1%    ALL LEAD PARAMETERS WITHIN NORMAL LIMITS   3 AT/AF EPISODES (<1%) WITH LONGEST EPISODE 11 H 20 M   PATIENT TAKES ELIQUIS, DIGOXIN, AND METOPROLOL   NO PROGRAMMING CHANGES MADE TO DEVICE PARAMETERS   NORMAL DEVICE FUNCTION   RG

## 2023-06-19 ENCOUNTER — OFFICE VISIT (OUTPATIENT)
Dept: CARDIOLOGY CLINIC | Facility: CLINIC | Age: 58
End: 2023-06-19
Payer: MEDICARE

## 2023-06-19 VITALS
HEART RATE: 60 BPM | RESPIRATION RATE: 16 BRPM | HEIGHT: 70 IN | SYSTOLIC BLOOD PRESSURE: 126 MMHG | WEIGHT: 237 LBS | BODY MASS INDEX: 33.93 KG/M2 | DIASTOLIC BLOOD PRESSURE: 80 MMHG | OXYGEN SATURATION: 98 %

## 2023-06-19 DIAGNOSIS — I48.0 PAROXYSMAL A-FIB (HCC): ICD-10-CM

## 2023-06-19 DIAGNOSIS — E78.2 COMBINED HYPERLIPIDEMIA: ICD-10-CM

## 2023-06-19 DIAGNOSIS — Z79.01 CHRONIC ANTICOAGULATION: ICD-10-CM

## 2023-06-19 DIAGNOSIS — I10 HYPERTENSION, ESSENTIAL: ICD-10-CM

## 2023-06-19 DIAGNOSIS — I10 HYPERTENSION, ESSENTIAL: Primary | ICD-10-CM

## 2023-06-19 PROBLEM — Z45.010 EXHAUSTION OF CARDIAC PACEMAKER BATTERY: Status: RESOLVED | Noted: 2021-04-07 | Resolved: 2023-06-19

## 2023-06-19 PROCEDURE — 99214 OFFICE O/P EST MOD 30 MIN: CPT | Performed by: INTERNAL MEDICINE

## 2023-06-19 RX ORDER — SIMVASTATIN 20 MG
20 TABLET ORAL
Qty: 90 TABLET | Refills: 3 | Status: SHIPPED | OUTPATIENT
Start: 2023-06-19

## 2023-06-19 RX ORDER — DIGOXIN 125 MCG
TABLET ORAL
Qty: 90 TABLET | Refills: 3 | Status: SHIPPED | OUTPATIENT
Start: 2023-06-19

## 2023-06-19 RX ORDER — METOPROLOL SUCCINATE 50 MG/1
75 TABLET, EXTENDED RELEASE ORAL DAILY
Qty: 135 TABLET | Refills: 3 | Status: SHIPPED | OUTPATIENT
Start: 2023-06-19

## 2023-06-19 RX ORDER — APIXABAN 5 MG/1
5 TABLET, FILM COATED ORAL 2 TIMES DAILY
Qty: 180 TABLET | Refills: 3 | Status: SHIPPED | OUTPATIENT
Start: 2023-06-19

## 2023-06-19 NOTE — TELEPHONE ENCOUNTER
Pt requested med refills of metoprolol 50 mg, digoxin 125 mg, eliquis 5 mg, and simvastatin 20 mg      Scripts e-scribed to pharmacy

## 2023-06-19 NOTE — PROGRESS NOTES
[facility] [place]  [id number]  Cardiology Follow Up    Richmond Herrera  1965  [id number]      1  Hypertension, essential        2  Paroxysmal A-fib ([facility] [address] )        3  Chronic anticoagulation        4  Combined hyperlipidemia            Chief Complaint   Patient presents with   • Follow-up       Interval History: Patient presents for follow-up visit  Patient denies any history of chest pain shortness of breath  Patient denies any history of leg edema or orthopnea PND  No history of presyncope syncope  Patient states compliance with the present list of medications  Patient denies any bleeding issues  Patient is on anticoagulation for paroxysmal atrial fibrillation  Patient is regularly followed by the device clinic for pacemaker      Patient Active Problem List   Diagnosis   • Paroxysmal A-fib (HCC)   • Hypertension, essential   • Chronic anticoagulation   • Chest discomfort   • Dyspnea on effort   • Sick sinus syndrome (HCC)   • Stage 3b chronic kidney disease (HCC)     Past Medical History:   Diagnosis Date   • Abdominal aortic aneurysm (AAA) (HCC)    • Abnormal ECG    • Atrial fibrillation [facility])    • Cardiac arrhythmia    • Chest pain    • Coronary atherosclerosis of native coronary artery    • Hyperlipidemia    • Hypertension    • Myocardial infarction ([facility] )    • Sinoatrial node dysfunction (HCC)    • SOB (shortness of breath)      Social History     Socioeconomic History   • Marital status: Single     Spouse name: Not on file   • Number of children: Not on file   • Years of education: Not on file   • Highest education level: Not on file   Occupational History   • Not on file   Tobacco Use   • Smoking status: Never   • Smokeless tobacco: Never   Vaping Use   • Vaping Use: Never used   Substance and Sexual Activity   • Alcohol use: No   • Drug use: Never   • Sexual activity: Not on file   Other Topics Concern   • Not on file   Social History Narrative • Not on file     Social Determinants of Health     Financial Resource Strain: Not on file   Food Insecurity: Not on file   Transportation Needs: Not on file   Physical Activity: Not on file   Stress: Not on file   Social Connections: Not on file   Intimate Partner Violence: Not on file   Housing Stability: Not on file      History reviewed  No pertinent family history    Past Surgical History:   Procedure Laterality Date   • CARDIAC CATHETERIZATION     • INSERT / REPLACE / REMOVE PACEMAKER     • PELVIC AND PARA-AORTIC LYMPH NODE DISSECTION         Current Outpatient Medications:   •  amLODIPine (NORVASC) 5 mg tablet, Take 1 tablet (5 mg total) by mouth daily, Disp: 90 tablet, Rfl: 3  •  Cholecalciferol 50 MCG (2000 UT) CAPS, Take 2 capsules by mouth, Disp: , Rfl:   •  Cyclobenzaprine HCl (FLEXERIL PO), 1 tablet at bedtime, Disp: , Rfl:   •  FLUoxetine (PROzac) 10 mg capsule, 10 mg daily , Disp: , Rfl: 0  •  hydrOXYzine HCL (ATARAX) 50 mg tablet, Take 1 tablet by mouth, Disp: , Rfl:   •  lisinopril (ZESTRIL) 2 5 mg tablet, Take 2 5 mg by mouth daily, Disp: , Rfl:   •  lisinopril (ZESTRIL) 5 mg tablet, Take 1 tablet (5 mg total) by mouth daily, Disp: 90 tablet, Rfl: 3  •  omeprazole (PriLOSEC) 20 mg delayed release capsule, Take 20 mg by mouth daily, Disp: , Rfl: 0  •  ROZEREM 8 MG tablet, 8 mg daily at bedtime , Disp: , Rfl: 0  •  tamsulosin (FLOMAX) 0 4 mg, Take 1 capsule (0 4 mg total) by mouth daily with dinner, Disp: 90 capsule, Rfl: 3  •  ULORIC 40 MG tablet, Take 40 mg by mouth daily, Disp: , Rfl: 0  •  digoxin (Digitek) 0 125 mg tablet, 1 tab every other day, Disp: 90 tablet, Rfl: 3  •  Eliquis 5 MG, Take 1 tablet (5 mg total) by mouth 2 (two) times a day, Disp: 180 tablet, Rfl: 3  •  metoprolol succinate (TOPROL-XL) 50 mg 24 hr tablet, Take 1 5 tablets (75 mg total) by mouth daily, Disp: 135 tablet, Rfl: 3  •  simvastatin (ZOCOR) 20 mg tablet, Take 1 tablet (20 mg total) by mouth daily at bedtime, Disp: 90 "tablet, Rfl: 3  •  ziprasidone (GEODON) 40 mg capsule, 40 mg daily at bedtime , Disp: , Rfl: 0  No Known Allergies    Labs:  No visits with results within 2 Month(s) from this visit  Latest known visit with results is:   Procedure visit on 01/06/2022   Component Date Value   • POST-VOID RESIDUAL VOLUM* 01/06/2022 58      Imaging: Cardiac EP device report    Result Date: 6/1/2023  Narrative: SJM DUAL CHAMBER PPM /NOT MRI CONDITIONAL DEVICE INTERROGATED IN THE [place] OFFICE:  BATTERY VOLTAGE ADEQUATE (7 9-8 2 YR )  AP 95%  <1%  ALL LEAD PARAMETERS WITHIN NORMAL LIMITS  3 AT/AF EPISODES (<1%) WITH LONGEST EPISODE 11 H 20 M  PATIENT TAKES ELIQUIS, DIGOXIN, AND METOPROLOL  NO PROGRAMMING CHANGES MADE TO DEVICE PARAMETERS  NORMAL DEVICE FUNCTION  RG       Review of Systems:  Review of Systems   REVIEW OF SYSTEMS:  Constitutional:  Denies fever or chills   Eyes:  Denies change in visual acuity   HENT:  Denies nasal congestion or sore throat   Respiratory:  Denies cough or shortness of breath   Cardiovascular:  Denies chest pain or edema   GI:  Denies abdominal pain, nausea, vomiting, bloody stools or diarrhea   :  Denies dysuria, frequency, difficulty in micturition and nocturia  Musculoskeletal:  Denies back pain or joint pain   Neurologic:  Denies headache, focal weakness or sensory changes   Endocrine:  Denies polyuria or polydipsia   Lymphatic:  Denies swollen glands   Psychiatric:  Denies depression or anxiety      Physical Exam:    /80 (BP Location: Left arm, Patient Position: Sitting, Cuff Size: Standard)   Pulse 60   Resp 16   Ht 5' 10\" (1 778 m)   Wt 108 kg (237 lb)   SpO2 98%   BMI 34 01 kg/m²     Physical Exam   PHYSICAL EXAM:  General:  Patient is not in acute distress   Head: Normocephalic, Atraumatic  HEENT:  Both pupils normal-size atraumatic, normocephalic, nonicteric  Neck:  JVP not raised   Trachea central  No carotid bruit  Respiratory:  normal breath sounds no " crackles  no rhonchi  Cardiovascular:  Regular rate and rhythm no S3 no murmurs  GI:  Abdomen soft nontender  No organomegaly  Lymphatic:  No cervical or inguinal lymphadenopathy  Neurologic:  Patient is awake alert, oriented   Grossly nonfocal  Extremities no edema    Discussion/Summary:  Patient with multiple medical problems who seems to be doing reasonably well from cardiac standpoint  Previous studies reviewed with patient  Medications reviewed and possible side effects discussed  concepts of cardiovascular disease , signs and symptoms of heart disease  Dietary and risk factor modification reinforced  All questions answered  Safety measures reviewed  Patient advised to report any problems prompting medical attention  Risks and benefits  and alternatives of anticoagulation to prevent thromboembolic risk from paroxysmal atrial fibrillation discussed at length  Patient to report any bleeding issues  Patient will continue to follow-up with device clinic  Recent pacemaker interrogation data reviewed with patient  Previous cardiovascular studies reviewed  Follow-up in 6 months or earlier as needed  Patient is agreeable with the plan of care

## 2023-06-26 ENCOUNTER — TELEPHONE (OUTPATIENT)
Dept: UROLOGY | Facility: MEDICAL CENTER | Age: 58
End: 2023-06-26

## 2023-06-26 DIAGNOSIS — R39.9 UTI SYMPTOMS: Primary | ICD-10-CM

## 2023-06-26 NOTE — TELEPHONE ENCOUNTER
Called and spoke with patient  Reports darker colored foamy urine for past few days, Pt reports frequency and urgency  Pt denies fever, chills, nausea or vomiting  Pt denies any blood in his urine  Orders placed for urine testing  Pt reports may not be able to get it done until Friday  Did advise if symptoms get worse should do it sooner  ER precautions reviewed   {t states understanding

## 2023-06-26 NOTE — TELEPHONE ENCOUNTER
PT under care of: roshan      Pt last seen: 1/6/22    PT calling today because: possible UTI      PT symptoms are patient is having at times burning, dark yellow urine, foam in urine, warm in the abdomen     All symptoms have started today and are intermittent     Not sure if there is blood in the urine     Denies vomiting, nausea,      PT can be reached at:[phone]

## 2023-06-27 NOTE — TELEPHONE ENCOUNTER
Urine testing not yet obtained  Will monitor   Patient also hasn't been seen in 1 1/2 years, may need non-urgent follow up

## 2023-06-28 NOTE — TELEPHONE ENCOUNTER
Pt's niece Stella Navarro called stated that pt is feeling good and has no symptoms  Pt does not want to get urine test done since his urine is clear

## 2023-09-05 ENCOUNTER — REMOTE DEVICE CLINIC VISIT (OUTPATIENT)
Dept: CARDIOLOGY CLINIC | Facility: CLINIC | Age: 58
End: 2023-09-05
Payer: MEDICARE

## 2023-09-05 DIAGNOSIS — Z95.0 S/P CARDIAC PACEMAKER PROCEDURE: ICD-10-CM

## 2023-09-05 PROCEDURE — 93296 REM INTERROG EVL PM/IDS: CPT | Performed by: INTERNAL MEDICINE

## 2023-09-05 PROCEDURE — 93294 REM INTERROG EVL PM/LDLS PM: CPT | Performed by: INTERNAL MEDICINE

## 2023-09-05 NOTE — PROGRESS NOTES
Results for orders placed or performed in visit on 09/05/23   Cardiac EP device report    Narrative    SJM DUAL CHAMBER PPM /NOT MRI CONDITIONAL  [employer] TRANSMISSION: BATTERY VOLTAGE ADEQUATE (7.5-7.9 YRS). AP 95%  <1% (DDDR 60PPM- AVD 225MS/200MS); ALL AVAILABLE LEAD PARAMETERS WITHIN NORMAL LIMITS. NO SIGNIFICANT HIGH RATE EPISODES. NORMAL DEVICE FUNCTION.   ES

## 2023-09-13 ENCOUNTER — OFFICE VISIT (OUTPATIENT)
Dept: CARDIOLOGY CLINIC | Facility: CLINIC | Age: 58
End: 2023-09-13
Payer: MEDICARE

## 2023-09-13 VITALS
BODY MASS INDEX: 30.92 KG/M2 | OXYGEN SATURATION: 98 % | HEART RATE: 60 BPM | WEIGHT: 216 LBS | RESPIRATION RATE: 16 BRPM | SYSTOLIC BLOOD PRESSURE: 118 MMHG | HEIGHT: 70 IN | DIASTOLIC BLOOD PRESSURE: 82 MMHG

## 2023-09-13 DIAGNOSIS — I48.0 PAROXYSMAL A-FIB (HCC): Primary | ICD-10-CM

## 2023-09-13 DIAGNOSIS — I71.03 DISSECTION OF THORACOABDOMINAL AORTA (HCC): ICD-10-CM

## 2023-09-13 DIAGNOSIS — Z79.01 CHRONIC ANTICOAGULATION: ICD-10-CM

## 2023-09-13 DIAGNOSIS — E78.2 COMBINED HYPERLIPIDEMIA: ICD-10-CM

## 2023-09-13 DIAGNOSIS — I49.5 SICK SINUS SYNDROME (HCC): ICD-10-CM

## 2023-09-13 DIAGNOSIS — Z95.0 PRESENCE OF PERMANENT CARDIAC PACEMAKER: ICD-10-CM

## 2023-09-13 DIAGNOSIS — I10 HYPERTENSION, ESSENTIAL: ICD-10-CM

## 2023-09-13 PROCEDURE — 99213 OFFICE O/P EST LOW 20 MIN: CPT | Performed by: NURSE PRACTITIONER

## 2023-09-13 NOTE — PROGRESS NOTES
Cardiology Office Note    Celine Herrera 63 y.o. male MRN: [id number]    09/13/23          Assessment/Plan:    1. Recent rectal bleeding  -Noted to have mild viral diverticulitis  - Reports bleeding has resolved, has resumed Eliquis without recurrence of bleeding  - Instructed to monitor for bleeding very closely and to notify the office if it happens again     2. Paroxysmal atrial fibrillation  - Denies palpitations  -Heart rates controlled in the 60s with metoprolol succinate and digoxin  - Continue Eliquis 5 mg twice daily, instructed to report any recurrence of rectal bleeding    3. Hypertension  - Blood pressure overall well controlled on amlodipine, lisinopril, metoprolol succinate  - Continue ambulatory pressure monitoring    4. Hyperlipidemia  - Continue simvastatin    5. Sinus syndrome s/p [facility] pacemaker implantation  - Most recent pacemaker interrogation performed this month revealed no high rate episodes and normal device function  - Continue follow-up with device clinic    6. CKD stage 3     Follow up: 4 months or sooner as needed    1. Paroxysmal A-fib ([address])        2. Hypertension, essential        3. Sick sinus syndrome ([address])        4. Presence of permanent cardiac pacemaker        5. Combined hyperlipidemia        6. Chronic anticoagulation        7. Dissection of thoracoabdominal aorta (HCC)            HPI: Nu Richard is a 63y.o. year old male with a past medical history of CKD stage III, paroxysmal atrial fibrillation on Eliquis, hypertension, hyperlipidemia, DVT, GERD, thoracic aortic aneurysm with dissection, sick sinus syndrome s/p [facility] pacemaker implantation who presents today for hospital follow-up. He was seen in [address] [place] [address] emergency room on 9/7/2023 for rectal bleeding. CT of the abdomen showed concern for early/mild ascending colon diverticulitis prescribed antibiotics and to hold his Eliquis for 2 days follow-up with gastroenterology.     Today he states that his rectal bleeding has stopped and that he has resumed his Eliquis without any further bleeding. He reports that he has not had any further chest tightness since his negative stress testing performed in August 2023. He was instructed that if he develops further chest tightness or rectal bleeding he should notify this office right away or report to a local emergency room for further evaluation. Last TTE was performed in April 2021 that revealed normal systolic function with an EF of 60%, no regional wall motion abnormalities, grade 1 diastolic dysfunction. As mentioned above, he had a pharmacological stress test performed in August 2023 that was negative. He denies chest pain, dyspnea on exertion or rest, lower extremity edema, or palpitations and was instructed to call  the office or seek medical attention if any such symptoms develop. All medications reviewed and patient is tolerating medications without side effects. I asked him if he would like me to call his niece, with whom he lives with  to update her on his office visit and he declined at this time and stated he would update her.     Patient Active Problem List   Diagnosis   • Paroxysmal A-fib (HCC)   • Hypertension, essential   • Chronic anticoagulation   • Chest discomfort   • Dyspnea on effort   • Sick sinus syndrome (HCC)   • Stage 3b chronic kidney disease (HCC)       No Known Allergies      Current Outpatient Medications:   •  amLODIPine (NORVASC) 5 mg tablet, Take 1 tablet (5 mg total) by mouth daily, Disp: 90 tablet, Rfl: 3  •  Cholecalciferol 50 MCG (2000 UT) CAPS, Take 2 capsules by mouth, Disp: , Rfl:   •  Cyclobenzaprine HCl (FLEXERIL PO), 1 tablet at bedtime, Disp: , Rfl:   •  digoxin (Digitek) 0.125 mg tablet, 1 tab every other day, Disp: 90 tablet, Rfl: 3  •  Eliquis 5 MG, Take 1 tablet (5 mg total) by mouth 2 (two) times a day, Disp: 180 tablet, Rfl: 3  •  FLUoxetine (PROzac) 10 mg capsule, 10 mg daily , Disp: , Rfl: 0  •  hydrOXYzine HCL (ATARAX) 50 mg tablet, Take 1 tablet by mouth, Disp: , Rfl:   •  lisinopril (ZESTRIL) 2.5 mg tablet, Take 2.5 mg by mouth daily, Disp: , Rfl:   •  lisinopril (ZESTRIL) 5 mg tablet, Take 1 tablet (5 mg total) by mouth daily, Disp: 90 tablet, Rfl: 3  •  metoprolol succinate (TOPROL-XL) 50 mg 24 hr tablet, Take 1.5 tablets (75 mg total) by mouth daily, Disp: 135 tablet, Rfl: 3  •  omeprazole (PriLOSEC) 20 mg delayed release capsule, Take 20 mg by mouth daily, Disp: , Rfl: 0  •  ROZEREM 8 MG tablet, 8 mg daily at bedtime , Disp: , Rfl: 0  •  simvastatin (ZOCOR) 20 mg tablet, Take 1 tablet (20 mg total) by mouth daily at bedtime, Disp: 90 tablet, Rfl: 3  •  tamsulosin (FLOMAX) 0.4 mg, Take 1 capsule (0.4 mg total) by mouth daily with dinner, Disp: 90 capsule, Rfl: 3  •  ULORIC 40 MG tablet, Take 40 mg by mouth daily, Disp: , Rfl: 0  •  ziprasidone (GEODON) 40 mg capsule, 40 mg daily at bedtime , Disp: , Rfl: 0    Past Medical History:   Diagnosis Date   • Abdominal aortic aneurysm (AAA) (HCC)    • Abnormal ECG    • Atrial fibrillation [facility])    • Cardiac arrhythmia    • Chest pain    • Coronary atherosclerosis of native coronary artery    • Hyperlipidemia    • Hypertension    • Myocardial infarction ([address])    • Sinoatrial node dysfunction (HCC)    • SOB (shortness of breath)        History reviewed. No pertinent family history.     Past Surgical History:   Procedure Laterality Date   • CARDIAC CATHETERIZATION     • INSERT / REPLACE / REMOVE PACEMAKER     • PELVIC AND PARA-AORTIC LYMPH NODE DISSECTION         Social History     Socioeconomic History   • Marital status: Single     Spouse name: Not on file   • Number of children: Not on file   • Years of education: Not on file   • Highest education level: Not on file   Occupational History   • Not on file   Tobacco Use   • Smoking status: Never   • Smokeless tobacco: Never   Vaping Use   • Vaping Use: Never used   Substance and Sexual Activity   • Alcohol use: No   • Drug use: Never   • Sexual activity: Not on file   Other Topics Concern   • Not on file   Social History Narrative   • Not on file     Social Determinants of Health     Financial Resource Strain: Not on file   Food Insecurity: Not on file   Transportation Needs: Not on file   Physical Activity: Not on file   Stress: Not on file   Social Connections: Not on file   Intimate Partner Violence: Not on file   Housing Stability: Not on file       Review of symptoms:   Constitution:  Negative  HEENT:  Negative  Cardiovascular:  Negative  Respiratory:  Negative  Skin:  Negative  Gastrointestinal:  Negative  Genitourinary:  Negative  Musculoskeletal:  Negative  Neurological:  Negative  Endocrine:  Negative  Psychological:  Negative    Vitals: /82 (BP Location: Left arm, Patient Position: Sitting, Cuff Size: Standard)   Pulse 60   Resp 16   Ht 5' 10" (1.778 m)   Wt 98 kg (216 lb)   SpO2 98%   BMI 30.99 kg/m²         Physical Exam:     GEN: Alert and oriented x 3, in no acute distress. Well appearing and well nourished. HEENT: Sclera anicteric, conjunctivae pink, mucous membranes moist.   NECK: Supple, no carotid bruits, no significant JVD. Trachea midline. HEART: Regular rhythm, normal S1 and S2, no murmurs, clicks, gallops or rubs. LUNGS: Clear to auscultation bilaterally; no wheezes, rales, or rhonchi. No increased work of breathing or signs of respiratory distress. ABDOMEN: Soft, nontender, nondistended, normoactive bowel sounds. EXTREMITIES: Skin warm and well perfused, no clubbing, cyanosis, or edema. NEURO: No focal findings. Normal speech. Mood and affect normal.   SKIN: Normal without suspicious lesions on exposed skin.

## 2023-09-20 DIAGNOSIS — N40.0 BENIGN PROSTATIC HYPERPLASIA WITHOUT LOWER URINARY TRACT SYMPTOMS: ICD-10-CM

## 2023-09-20 RX ORDER — TAMSULOSIN HYDROCHLORIDE 0.4 MG/1
0.4 CAPSULE ORAL
Qty: 90 CAPSULE | Refills: 3 | Status: CANCELLED | OUTPATIENT
Start: 2023-09-20

## 2023-09-26 DIAGNOSIS — N40.0 BENIGN PROSTATIC HYPERPLASIA WITHOUT LOWER URINARY TRACT SYMPTOMS: ICD-10-CM

## 2023-09-26 RX ORDER — TAMSULOSIN HYDROCHLORIDE 0.4 MG/1
0.4 CAPSULE ORAL
Qty: 30 CAPSULE | Refills: 0 | Status: SHIPPED | OUTPATIENT
Start: 2023-09-26

## 2023-10-23 NOTE — PROGRESS NOTES
10/24/2023      Chief Complaint   Patient presents with    Follow-up    Benign Prostatic Hypertrophy     Assessment and Plan    BPH with LUTS  History of recurrent UTI  - Normal cystoscopy on 1/6/22   - Well managed on Flomax  - Urine dip today appears positive, c/o intermittent dysuria and cloudy urine. Sent out for testing.   - ONIEL negative. Recent PSA normal.  - Will contact with urine testing results. As long as he continues to do well on Flomax without recurrent UTIs, can follow up PRN and continue yearly prostate cancer screening through PCP. History of Present Illness  Meryle Montenegro is a 63 y.o. male here for follow up evaluation of above. He reports he has been doing well. Reports Flomax continues to be effective. Does note intermittent dysuria at times that is not bothersome to him. He denies any recent UTIs. PSA from 4/7/23 was 0.99    AUA SYMPTOM SCORE      Flowsheet Row Most Recent Value   AUA SYMPTOM SCORE    How often have you had a sensation of not emptying your bladder completely after you finished urinating? 1   How often have you had to urinate again less than two hours after you finished urinating? 3   How often have you found you stopped and started again several times when you urinate? 3   How often have you found it difficult to postpone urination? 2   How often have you had a weak urinary stream? 3   How often have you had to push or strain to begin urination? 1   How many times did you most typically get up to urinate from the time you went to bed at night until the time you got up in the morning? 2   Quality of Life: If you were to spend the rest of your life with your urinary condition just the way it is now, how would you feel about that? 2   AUA SYMPTOM SCORE 15              Review of Systems   Constitutional:  Negative for chills and fever. Respiratory:  Negative for shortness of breath. Cardiovascular:  Negative for chest pain.    Gastrointestinal:  Negative for abdominal pain.   Genitourinary:  Positive for dysuria. Negative for difficulty urinating, flank pain, frequency, hematuria and urgency. Neurological:  Negative for dizziness.                   Past Medical History  Past Medical History:   Diagnosis Date    Abdominal aortic aneurysm (AAA) (HCC)     Abnormal ECG     Atrial fibrillation (HCC)     Cardiac arrhythmia     Chest pain     Coronary atherosclerosis of native coronary artery     Hyperlipidemia     Hypertension     Myocardial infarction (HCC)     Sinoatrial node dysfunction (HCC)     SOB (shortness of breath)        Past Social History  Past Surgical History:   Procedure Laterality Date    CARDIAC CATHETERIZATION      INSERT / REPLACE / REMOVE PACEMAKER      PELVIC AND PARA-AORTIC LYMPH NODE DISSECTION       Social History     Tobacco Use   Smoking Status Never    Passive exposure: Never   Smokeless Tobacco Never       Past Family History  Family History   Problem Relation Age of Onset    No Known Problems Father     No Known Problems Mother        Past Social history  Social History     Socioeconomic History    Marital status: Single     Spouse name: Not on file    Number of children: Not on file    Years of education: Not on file    Highest education level: Not on file   Occupational History    Not on file   Tobacco Use    Smoking status: Never     Passive exposure: Never    Smokeless tobacco: Never   Vaping Use    Vaping Use: Never used   Substance and Sexual Activity    Alcohol use: Never    Drug use: Never    Sexual activity: Yes     Partners: Female   Other Topics Concern    Not on file   Social History Narrative    Not on file     Social Determinants of Health     Financial Resource Strain: Not on file   Food Insecurity: Not on file   Transportation Needs: Not on file   Physical Activity: Not on file   Stress: Not on file   Social Connections: Not on file   Intimate Partner Violence: Not on file   Housing Stability: Not on file       Current Medications  Current Outpatient Medications   Medication Sig Dispense Refill    amLODIPine (NORVASC) 5 mg tablet Take 1 tablet (5 mg total) by mouth daily 90 tablet 3    Cholecalciferol 50 MCG (2000 UT) CAPS Take 2 capsules by mouth      Cyclobenzaprine HCl (FLEXERIL PO) 1 tablet at bedtime      digoxin (Digitek) 0.125 mg tablet 1 tab every other day 90 tablet 3    Eliquis 5 MG Take 1 tablet (5 mg total) by mouth 2 (two) times a day 180 tablet 3    FLUoxetine (PROzac) 10 mg capsule 10 mg daily   0    hydrOXYzine HCL (ATARAX) 50 mg tablet Take 1 tablet by mouth      lisinopril (ZESTRIL) 2.5 mg tablet Take 2.5 mg by mouth daily      metoprolol succinate (TOPROL-XL) 50 mg 24 hr tablet Take 1.5 tablets (75 mg total) by mouth daily 135 tablet 3    omeprazole (PriLOSEC) 20 mg delayed release capsule Take 20 mg by mouth daily  0    ROZEREM 8 MG tablet 8 mg daily at bedtime   0    simvastatin (ZOCOR) 20 mg tablet Take 1 tablet (20 mg total) by mouth daily at bedtime 90 tablet 3    tamsulosin (FLOMAX) 0.4 mg Take 1 capsule (0.4 mg total) by mouth daily with dinner 30 capsule 0    ULORIC 40 MG tablet Take 40 mg by mouth daily  0    ziprasidone (GEODON) 40 mg capsule 40 mg daily at bedtime   0     No current facility-administered medications for this visit. Allergies  No Known Allergies      The following portions of the patient's history were reviewed and updated as appropriate: allergies, current medications, past medical history, past social history, past surgical history and problem list.      Vitals  Vitals:    10/24/23 0916   BP: 117/80   Pulse: 60   Resp: 18   Weight: 101 kg (223 lb)   Height: 5' 10" (1.778 m)           Physical Exam  Physical Exam  Constitutional:       Appearance: Normal appearance. HENT:      Head: Normocephalic and atraumatic. Right Ear: External ear normal.      Left Ear: External ear normal.      Nose: Nose normal.   Eyes:      General: No scleral icterus.      Conjunctiva/sclera: Conjunctivae normal. Cardiovascular:      Pulses: Normal pulses. Pulmonary:      Effort: Pulmonary effort is normal.   Genitourinary:     Comments: No prostatic nodularity or tenderness  Musculoskeletal:         General: Normal range of motion. Cervical back: Normal range of motion. Skin:     General: Skin is warm and dry. Neurological:      General: No focal deficit present. Mental Status: He is alert and oriented to person, place, and time. Psychiatric:         Mood and Affect: Mood normal.         Behavior: Behavior normal.         Thought Content:  Thought content normal.         Judgment: Judgment normal.           Results  Recent Results (from the past 1 hour(s))   POCT urine dip    Collection Time: 10/24/23  9:18 AM   Result Value Ref Range    LEUKOCYTE ESTERASE,UA +     NITRITE,UA +     SL AMB POCT UROBILINOGEN 0.2     POCT URINE PROTEIN +      PH,UA 5.0     BLOOD,UA ++     SPECIFIC GRAVITY,UA 1.015     KETONES,UA +     BILIRUBIN,UA +     GLUCOSE, UA -      COLOR,UA dark     CLARITY,UA clear    POCT Measure PVR    Collection Time: 10/24/23  9:21 AM   Result Value Ref Range    POST-VOID RESIDUAL VOLUME, ML POC 7 mL   ]  No results found for: "PSA"  Lab Results   Component Value Date    CALCIUM 9.5 01/20/2017     01/20/2017    K 4.8 01/20/2017    CO2 23 01/20/2017     01/20/2017    BUN 34 (H) 01/20/2017    CREATININE 1.76 (H) 01/20/2017     No results found for: "WBC", "HGB", "HCT", "MCV", "PLT"        Orders  Orders Placed This Encounter   Procedures    POCT Measure PVR    POCT urine dip     Mike Kincaid

## 2023-10-24 ENCOUNTER — OFFICE VISIT (OUTPATIENT)
Dept: UROLOGY | Facility: CLINIC | Age: 58
End: 2023-10-24
Payer: MEDICARE

## 2023-10-24 VITALS
BODY MASS INDEX: 31.92 KG/M2 | HEART RATE: 60 BPM | RESPIRATION RATE: 18 BRPM | SYSTOLIC BLOOD PRESSURE: 117 MMHG | HEIGHT: 70 IN | WEIGHT: 223 LBS | DIASTOLIC BLOOD PRESSURE: 80 MMHG

## 2023-10-24 DIAGNOSIS — R39.9 UTI SYMPTOMS: Primary | ICD-10-CM

## 2023-10-24 DIAGNOSIS — N40.1 BENIGN LOCALIZED PROSTATIC HYPERPLASIA WITH LOWER URINARY TRACT SYMPTOMS (LUTS): ICD-10-CM

## 2023-10-24 LAB
BACTERIA UR QL AUTO: ABNORMAL /HPF
BILIRUB UR QL STRIP: NEGATIVE
CLARITY UR: CLEAR
COLOR UR: ABNORMAL
GLUCOSE UR STRIP-MCNC: NEGATIVE MG/DL
HGB UR QL STRIP.AUTO: NEGATIVE
KETONES UR STRIP-MCNC: NEGATIVE MG/DL
LEUKOCYTE ESTERASE UR QL STRIP: NEGATIVE
NITRITE UR QL STRIP: NEGATIVE
NON-SQ EPI CELLS URNS QL MICRO: ABNORMAL /HPF
PH UR STRIP.AUTO: 6 [PH]
POST-VOID RESIDUAL VOLUME, ML POC: 7 ML
PROT UR STRIP-MCNC: ABNORMAL MG/DL
RBC #/AREA URNS AUTO: ABNORMAL /HPF
SL AMB  POCT GLUCOSE, UA: NORMAL
SL AMB LEUKOCYTE ESTERASE,UA: NORMAL
SL AMB POCT BILIRUBIN,UA: NORMAL
SL AMB POCT BLOOD,UA: NORMAL
SL AMB POCT CLARITY,UA: CLEAR
SL AMB POCT COLOR,UA: NORMAL
SL AMB POCT KETONES,UA: NORMAL
SL AMB POCT NITRITE,UA: NORMAL
SL AMB POCT PH,UA: 5
SL AMB POCT SPECIFIC GRAVITY,UA: 1.01
SL AMB POCT URINE PROTEIN: NORMAL
SL AMB POCT UROBILINOGEN: 0.2
SP GR UR STRIP.AUTO: 1.01 (ref 1–1.03)
UROBILINOGEN UR STRIP-ACNC: <2 MG/DL
WBC #/AREA URNS AUTO: ABNORMAL /HPF

## 2023-10-24 PROCEDURE — 51798 US URINE CAPACITY MEASURE: CPT | Performed by: PHYSICIAN ASSISTANT

## 2023-10-24 PROCEDURE — 99213 OFFICE O/P EST LOW 20 MIN: CPT | Performed by: PHYSICIAN ASSISTANT

## 2023-10-24 PROCEDURE — 81001 URINALYSIS AUTO W/SCOPE: CPT | Performed by: PHYSICIAN ASSISTANT

## 2023-10-24 PROCEDURE — 87086 URINE CULTURE/COLONY COUNT: CPT | Performed by: PHYSICIAN ASSISTANT

## 2023-10-24 PROCEDURE — 81002 URINALYSIS NONAUTO W/O SCOPE: CPT | Performed by: PHYSICIAN ASSISTANT

## 2023-10-24 RX ORDER — TAMSULOSIN HYDROCHLORIDE 0.4 MG/1
0.4 CAPSULE ORAL
Qty: 90 CAPSULE | Refills: 3 | Status: SHIPPED | OUTPATIENT
Start: 2023-10-24

## 2023-10-25 LAB — BACTERIA UR CULT: NORMAL

## 2023-10-27 ENCOUNTER — TELEPHONE (OUTPATIENT)
Dept: UROLOGY | Facility: CLINIC | Age: 58
End: 2023-10-27

## 2023-10-27 NOTE — TELEPHONE ENCOUNTER
Called and spoke to PT minh Stone with results.  Roxy Stone Verbalized understanding.    ----- Message from Janene López PA-C sent at 10/26/2023  9:36 AM EDT -----  Urine culture negative

## 2023-12-06 ENCOUNTER — REMOTE DEVICE CLINIC VISIT (OUTPATIENT)
Dept: CARDIOLOGY CLINIC | Facility: CLINIC | Age: 58
End: 2023-12-06
Payer: MEDICARE

## 2023-12-06 DIAGNOSIS — Z95.0 CARDIAC PACEMAKER IN SITU: Primary | ICD-10-CM

## 2023-12-06 PROCEDURE — 93294 REM INTERROG EVL PM/LDLS PM: CPT | Performed by: INTERNAL MEDICINE

## 2023-12-06 PROCEDURE — 93296 REM INTERROG EVL PM/IDS: CPT | Performed by: INTERNAL MEDICINE

## 2023-12-06 NOTE — PROGRESS NOTES
Results for orders placed or performed in visit on 12/06/23   Cardiac EP device report    Narrative    SJM DUAL CHAMBER PPM /NOT MRI CONDITIONAL  MERLIN TRANSMISSION: BATTERY STATUS "7.5 YRS." AP 95%  1%. ALL AVAILABLE LEAD PARAMETERS WITHIN NORMAL LIMITS. NO SIGNIFICANT HIGH RATE EPISODES. NORMAL DEVICE FUNCTION.  NC

## 2023-12-26 ENCOUNTER — TELEPHONE (OUTPATIENT)
Dept: CARDIOLOGY CLINIC | Facility: CLINIC | Age: 58
End: 2023-12-26

## 2023-12-26 NOTE — TELEPHONE ENCOUNTER
Fax received from [facility]   [place] Office    Date of procedure: 02/28/2024  Procedure: Colonoscopy    Clearance request:   Cardiac clearance/risk assessment    Form scanned in pts chart and placed in Dr. Good's bin.    Does pt need appt for clearance?  Last appt was 09/13/2023 yuli/ Lynn RESENDIZ

## 2023-12-26 NOTE — TELEPHONE ENCOUNTER
Form completed and faxed to [phone].  Fax confirmation received. Scanned completed form in pts chart.

## 2024-02-07 NOTE — TELEPHONE ENCOUNTER
Monitor for urine testing  No longer taking Paxil, and is taking Effexor, but is unsure of dose. She reports that she is only taking 1 pill but she is unsure of the dose. She is still ahving feelings of sadness. Instructed her to call the office and review medication list.     Start taking Effexor 225mg every day.

## 2024-03-05 ENCOUNTER — REMOTE DEVICE CLINIC VISIT (OUTPATIENT)
Dept: CARDIOLOGY CLINIC | Facility: CLINIC | Age: 59
End: 2024-03-05
Payer: MEDICARE

## 2024-03-05 DIAGNOSIS — Z95.0 CARDIAC PACEMAKER IN SITU: Primary | ICD-10-CM

## 2024-03-05 PROCEDURE — 93296 REM INTERROG EVL PM/IDS: CPT | Performed by: INTERNAL MEDICINE

## 2024-03-05 PROCEDURE — 93294 REM INTERROG EVL PM/LDLS PM: CPT | Performed by: INTERNAL MEDICINE

## 2024-03-05 NOTE — PROGRESS NOTES
"Results for orders placed or performed in visit on 03/05/24   Cardiac EP device report    Narrative    SJM DUAL CHAMBER PPM /NOT MRI CONDITIONAL  MERLIN TRANSMISSION: BATTERY STATUS \"7 YRS.\" AP 96%  0%. ALL AVAILABLE LEAD PARAMETERS WITHIN NORMAL LIMITS. 1 AMS NOTED; 0% BURDEN; 11.43 HRS LONG; PT ON ELIQUIS. EGRAM SHOWS AF. NORMAL DEVICE FUNCTION. NC         "

## 2024-03-07 ENCOUNTER — TELEPHONE (OUTPATIENT)
Dept: CARDIOLOGY CLINIC | Facility: CLINIC | Age: 59
End: 2024-03-07

## 2024-03-07 NOTE — TELEPHONE ENCOUNTER
Fax received from [facility] EP regarding an eliquis hold for this pt. Completed form faxed back to provider and scanned into pt's chart.

## 2024-03-19 ENCOUNTER — OFFICE VISIT (OUTPATIENT)
Dept: CARDIOLOGY CLINIC | Facility: CLINIC | Age: 59
End: 2024-03-19
Payer: MEDICARE

## 2024-03-19 VITALS
RESPIRATION RATE: 16 BRPM | BODY MASS INDEX: 31.64 KG/M2 | OXYGEN SATURATION: 99 % | WEIGHT: 221 LBS | SYSTOLIC BLOOD PRESSURE: 120 MMHG | DIASTOLIC BLOOD PRESSURE: 82 MMHG | HEIGHT: 70 IN | HEART RATE: 60 BPM

## 2024-03-19 DIAGNOSIS — I10 HYPERTENSION, ESSENTIAL: ICD-10-CM

## 2024-03-19 DIAGNOSIS — Z79.01 CHRONIC ANTICOAGULATION: Primary | ICD-10-CM

## 2024-03-19 DIAGNOSIS — I49.5 SICK SINUS SYNDROME (HCC): ICD-10-CM

## 2024-03-19 DIAGNOSIS — E78.2 COMBINED HYPERLIPIDEMIA: ICD-10-CM

## 2024-03-19 DIAGNOSIS — N18.32 STAGE 3B CHRONIC KIDNEY DISEASE (HCC): ICD-10-CM

## 2024-03-19 DIAGNOSIS — I48.0 PAROXYSMAL A-FIB (HCC): ICD-10-CM

## 2024-03-19 PROCEDURE — 99214 OFFICE O/P EST MOD 30 MIN: CPT | Performed by: INTERNAL MEDICINE

## 2024-03-19 RX ORDER — APIXABAN 5 MG/1
5 TABLET, FILM COATED ORAL 2 TIMES DAILY
Qty: 180 TABLET | Refills: 3 | Status: SHIPPED | OUTPATIENT
Start: 2024-03-19

## 2024-03-19 RX ORDER — DIGOXIN 125 MCG
TABLET ORAL
Qty: 90 TABLET | Refills: 3 | Status: SHIPPED | OUTPATIENT
Start: 2024-03-19

## 2024-03-19 RX ORDER — SIMVASTATIN 20 MG
20 TABLET ORAL
Qty: 90 TABLET | Refills: 3 | Status: SHIPPED | OUTPATIENT
Start: 2024-03-19

## 2024-03-19 RX ORDER — METOPROLOL SUCCINATE 50 MG/1
75 TABLET, EXTENDED RELEASE ORAL DAILY
Qty: 135 TABLET | Refills: 3 | Status: SHIPPED | OUTPATIENT
Start: 2024-03-19

## 2024-03-19 RX ORDER — AMLODIPINE BESYLATE 5 MG/1
5 TABLET ORAL DAILY
Qty: 90 TABLET | Refills: 3 | Status: SHIPPED | OUTPATIENT
Start: 2024-03-19

## 2024-03-19 NOTE — PROGRESS NOTES
[facility] [place]  [address]  [place] [address]  Cardiology Follow Up    Yonathan LEMON Sharon  1965  [id number]      1. Chronic anticoagulation        2. Sick sinus syndrome (HCC)        3. Stage 3b chronic kidney disease (HCC)        4. Hypertension, essential  metoprolol succinate (TOPROL-XL) 50 mg 24 hr tablet    amLODIPine (NORVASC) 5 mg tablet      5. Paroxysmal A-fib (HCC)  Eliquis 5 MG    digoxin (Digitek) 0.125 mg tablet      6. Combined hyperlipidemia  simvastatin (ZOCOR) 20 mg tablet          Chief Complaint   Patient presents with    Follow-up       Interval History: Patient presents for follow-up visit.  Patient denies any history of chest pain shortness of breath.  Patient denies any history of leg edema or orthopnea PND.  No history of presyncope syncope.  Patient states compliance with the present list of medications.  Patient denies any bleeding issues.  Patient is followed by the device clinic for pacemaker.      Patient Active Problem List   Diagnosis    Paroxysmal A-fib (HCC)    Hypertension, essential    Chronic anticoagulation    Chest discomfort    Dyspnea on effort    Sick sinus syndrome (HCC)    Stage 3b chronic kidney disease (HCC)     Past Medical History:   Diagnosis Date    Abdominal aortic aneurysm (AAA) (HCC)     Abnormal ECG     Atrial fibrillation (HCC)     Cardiac arrhythmia     Chest pain     Coronary atherosclerosis of native coronary artery     Hyperlipidemia     Hypertension     Myocardial infarction (HCC)     Sinoatrial node dysfunction (HCC)     SOB (shortness of breath)      Social History     Socioeconomic History    Marital status: Single     Spouse name: Not on file    Number of children: Not on file    Years of education: Not on file    Highest education level: Not on file   Occupational History    Not on file   Tobacco Use    Smoking status: Never     Passive exposure: Never    Smokeless tobacco: Never   Vaping Use    Vaping status:  Never Used   Substance and Sexual Activity    Alcohol use: Never    Drug use: Never    Sexual activity: Yes     Partners: Female   Other Topics Concern    Not on file   Social History Narrative    Not on file     Social Determinants of Health     Financial Resource Strain: Low Risk  (8/18/2023)    Received from [facility]    Overall Financial Resource Strain (CARDIA)     Difficulty of Paying Living Expenses: Not hard at all   Food Insecurity: No Food Insecurity (8/18/2023)    Received from [facility]    Hunger Vital Sign     Worried About Running Out of Food in the Last Year: Never true     Ran Out of Food in the Last Year: Never true   Transportation Needs: No Transportation Needs (8/18/2023)    Received from [facility]    PRAPARE - Transportation     Lack of Transportation (Medical): No     Lack of Transportation (Non-Medical): No   Physical Activity: Not on file   Stress: Not on file   Social Connections: Not on file   Intimate Partner Violence: Not At Risk (8/18/2023)    Received from [facility]    Humiliation, Afraid, Rape, and Kick questionnaire     Fear of Current or Ex-Partner: No     Emotionally Abused: No     Physically Abused: No     Sexually Abused: No   Housing Stability: Low Risk  (8/18/2023)    Received from [facility]    Housing Stability Vital Sign     Unable to Pay for Housing in the Last Year: No     Number of Places Lived in the Last Year: 1     Unstable Housing in the Last Year: No      Family History   Problem Relation Age of Onset    No Known Problems Father     No Known Problems Mother      Past Surgical History:   Procedure Laterality Date    CARDIAC CATHETERIZATION      INSERT / REPLACE / REMOVE PACEMAKER      PELVIC AND PARA-AORTIC LYMPH NODE DISSECTION         Current Outpatient Medications:     amLODIPine (NORVASC) 5 mg tablet, Take 1 tablet (5 mg total) by mouth daily, Disp: 90 tablet, Rfl: 3     Cholecalciferol 50 MCG (2000 UT) CAPS, Take 2 capsules by mouth, Disp: , Rfl:     Cyclobenzaprine HCl (FLEXERIL PO), 1 tablet at bedtime, Disp: , Rfl:     digoxin (Digitek) 0.125 mg tablet, 1 tab every other day, Disp: 90 tablet, Rfl: 3    Eliquis 5 MG, Take 1 tablet (5 mg total) by mouth 2 (two) times a day, Disp: 180 tablet, Rfl: 3    FLUoxetine (PROzac) 10 mg capsule, 10 mg daily , Disp: , Rfl: 0    hydrOXYzine HCL (ATARAX) 50 mg tablet, Take 1 tablet by mouth, Disp: , Rfl:     lisinopril (ZESTRIL) 2.5 mg tablet, Take 2.5 mg by mouth daily, Disp: , Rfl:     metoprolol succinate (TOPROL-XL) 50 mg 24 hr tablet, Take 1.5 tablets (75 mg total) by mouth daily, Disp: 135 tablet, Rfl: 3    omeprazole (PriLOSEC) 20 mg delayed release capsule, Take 20 mg by mouth daily, Disp: , Rfl: 0    ROZEREM 8 MG tablet, 8 mg daily at bedtime , Disp: , Rfl: 0    simvastatin (ZOCOR) 20 mg tablet, Take 1 tablet (20 mg total) by mouth daily at bedtime, Disp: 90 tablet, Rfl: 3    tamsulosin (FLOMAX) 0.4 mg, Take 1 capsule (0.4 mg total) by mouth daily with dinner, Disp: 90 capsule, Rfl: 3    ULORIC 40 MG tablet, Take 40 mg by mouth daily, Disp: , Rfl: 0    ziprasidone (GEODON) 40 mg capsule, 40 mg daily at bedtime , Disp: , Rfl: 0  No Known Allergies    Labs:  No visits with results within 2 Month(s) from this visit.   Latest known visit with results is:   Office Visit on 10/24/2023   Component Date Value    POST-VOID RESIDUAL VOLUM* 10/24/2023 7     LEUKOCYTE ESTERASE,UA 10/24/2023 +     NITRITE,UA 10/24/2023 +     SL AMB POCT UROBILINOGEN 10/24/2023 0.2     POCT URINE PROTEIN 10/24/2023 +      PH,UA 10/24/2023 5.0     BLOOD,UA 10/24/2023 ++     SPECIFIC GRAVITY,UA 10/24/2023 1.015     KETONES,UA 10/24/2023 +     BILIRUBIN,UA 10/24/2023 +     GLUCOSE, UA 10/24/2023 -      COLOR,UA 10/24/2023 dark     CLARITY,UA 10/24/2023 clear     Urine Culture 10/24/2023 <10,000 cfu/ml     Color, UA 10/24/2023 Light Yellow     Clarity, UA 10/24/2023  "Clear     Specific [place], UA 10/24/2023 1.012     pH, UA 10/24/2023 6.0     Leukocytes, UA 10/24/2023 Negative     Nitrite, UA 10/24/2023 Negative     Protein, UA 10/24/2023 Trace (A)     Glucose, UA 10/24/2023 Negative     Ketones, UA 10/24/2023 Negative     Urobilinogen, UA 10/24/2023 <2.0     Bilirubin, UA 10/24/2023 Negative     Occult Blood, UA 10/24/2023 Negative     RBC, UA 10/24/2023 1-2     WBC, UA 10/24/2023 None Seen     Epithelial Cells 10/24/2023 None Seen     Bacteria, UA 10/24/2023 None Seen      Imaging: Cardiac EP device report    Result Date: 3/5/2024  Narrative: SJM DUAL CHAMBER PPM /NOT MRI CONDITIONAL MERLIN TRANSMISSION: BATTERY STATUS \"7 YRS.\" AP 96%  0%. ALL AVAILABLE LEAD PARAMETERS WITHIN NORMAL LIMITS. 1 AMS NOTED; 0% BURDEN; 11.43 HRS LONG; PT ON ELIQUIS. EGRAM SHOWS AF. NORMAL DEVICE FUNCTION. NC       Review of Systems:  Review of Systems  REVIEW OF SYSTEMS:  Constitutional:  Denies fever or chills   Eyes:  Denies change in visual acuity   HENT:  Denies nasal congestion or sore throat   Respiratory:  Denies cough or shortness of breath   Cardiovascular:  Denies chest pain or edema   GI:  Denies abdominal pain, nausea, vomiting, bloody stools or diarrhea   :  Denies dysuria, frequency, difficulty in micturition and nocturia  Musculoskeletal:  Denies back pain or joint pain   Neurologic:  Denies headache, focal weakness or sensory changes   Endocrine:  Denies polyuria or polydipsia   Lymphatic:  Denies swollen glands   Psychiatric:  Denies depression or anxiety      Physical Exam:    /82 (BP Location: Left arm, Patient Position: Sitting, Cuff Size: Standard)   Pulse 60   Resp 16   Ht 5' 10\" (1.778 m)   Wt 100 kg (221 lb)   SpO2 99%   BMI 31.71 kg/m²     Physical Exam  PHYSICAL EXAM:  General:  Patient is not in acute distress   Head: Normocephalic, Atraumatic.  HEENT:  Both pupils normal-size atraumatic, normocephalic, nonicteric  Neck:  JVP not raised. Trachea central. " No carotid bruit  Respiratory:  normal breath sounds no crackles. no rhonchi  Cardiovascular:  Regular rate and rhythm no S3 no murmurs  GI:  Abdomen soft nontender. No organomegaly.   Lymphatic:  No cervical or inguinal lymphadenopathy  Neurologic:  Patient is awake alert, oriented . Grossly nonfocal  Extremities no edema    Pacemaker interrogation data reviewed.  Normally functioning pacemaker.  Battery status is good.        Discussion/Summary:    Patient with multiple medical problems who seems to be doing reasonably well from cardiac standpoint. Previous studies reviewed with patient. Medications reviewed and possible side effects discussed. concepts of cardiovascular disease , signs and symptoms of heart disease. Dietary and risk factor modification reinforced. All questions answered.  Safety measures reviewed. Patient advised to report any problems prompting medical attention.    Risks and benefits  and alternatives of anticoagulation to prevent thromboembolic risk from atrial fibrillation discussed at length.  Patient to report any bleeding issues.    Patient will continue to follow-up with pacemaker clinic.  Follow-up with primary care physician.    Follow-up in 6 months or earlier as needed.  Patient is agreeable with the plan of care.

## 2024-06-06 ENCOUNTER — IN-CLINIC DEVICE VISIT (OUTPATIENT)
Dept: CARDIOLOGY CLINIC | Facility: CLINIC | Age: 59
End: 2024-06-06
Payer: MEDICARE

## 2024-06-06 DIAGNOSIS — Z95.0 PRESENCE OF PERMANENT CARDIAC PACEMAKER: Primary | ICD-10-CM

## 2024-06-06 PROCEDURE — 93280 PM DEVICE PROGR EVAL DUAL: CPT | Performed by: INTERNAL MEDICINE

## 2024-06-06 NOTE — PROGRESS NOTES
SJM DC PM/NOT MRI CONDITIONAL   DEVICE INTERROGATED IN THE [facility] OFFICE:  BATTERY VOLTAGE ADEQUATE (6.8-7.2 YR.).  AP 95%  <1%.  ALL LEAD PARAMETERS WITHIN NORMAL LIMITS.  NO SIGNIFICANT HIGH RATE EPISODES.  NO PROGRAMMING CHANGES MADE TO DEVICE PARAMETERS.  NORMAL DEVICE FUNCTION.  RG

## 2024-09-09 ENCOUNTER — TELEPHONE (OUTPATIENT)
Age: 59
End: 2024-09-09

## 2024-09-09 NOTE — TELEPHONE ENCOUNTER
Pt's niece Deena calls.  Spoke w/ pt, received verbal permission to speak with her as she is not on consent form.  She called to confirm pt's dates for device checks.

## 2024-09-11 ENCOUNTER — REMOTE DEVICE CLINIC VISIT (OUTPATIENT)
Dept: CARDIOLOGY CLINIC | Facility: CLINIC | Age: 59
End: 2024-09-11
Payer: COMMERCIAL

## 2024-09-11 DIAGNOSIS — Z95.0 CARDIAC PACEMAKER IN SITU: Primary | ICD-10-CM

## 2024-09-11 PROCEDURE — 93296 REM INTERROG EVL PM/IDS: CPT | Performed by: INTERNAL MEDICINE

## 2024-09-11 PROCEDURE — 93294 REM INTERROG EVL PM/LDLS PM: CPT | Performed by: INTERNAL MEDICINE

## 2024-09-11 NOTE — PROGRESS NOTES
"Results for orders placed or performed in visit on 09/11/24   Cardiac EP device report    Narrative    SJM DC PM/NOT MRI CONDITIONAL  MERLIN TRANSMISSION: BATTERY STATUS \"6 YRS.\" AP 96%  0%. ALL AVAILABLE LEAD PARAMETERS WITHIN NORMAL LIMITS. NO SIGNIFICANT HIGH RATE EPISODES. NORMAL DEVICE FUNCTION. NC         "

## 2024-09-27 ENCOUNTER — TELEPHONE (OUTPATIENT)
Age: 59
End: 2024-09-27

## 2024-09-27 NOTE — TELEPHONE ENCOUNTER
Caller: Deena ( Pt's Niece)     Doctor:      Reason for call: Deena pt's niece called & stated that pt's kidney doctor is requesting documentation that Dr. MURILLO  made changes to increase Metoprolol Succinate.     Please fax documentation [phone]    Deena also would like to know when pt should start taking the Metoprolol Succinate with the increase.      Deena can be reached [phone]

## 2024-09-27 NOTE — TELEPHONE ENCOUNTER
Based on chart review, patient should be on metoprolol succinate 50 mg 1-1/2 tablet daily.    Patient has an appointment with cardiology coming up.    Please make sure patient has enough in terms of prescription.  Otherwise we can send.

## 2024-09-30 NOTE — TELEPHONE ENCOUNTER
Called and lvm for pt relaying  advised and to give the office a call back regarding any questions or concerns.

## 2024-10-15 DIAGNOSIS — N40.1 BENIGN LOCALIZED PROSTATIC HYPERPLASIA WITH LOWER URINARY TRACT SYMPTOMS (LUTS): ICD-10-CM

## 2024-10-15 RX ORDER — TAMSULOSIN HYDROCHLORIDE 0.4 MG/1
0.4 CAPSULE ORAL
Qty: 90 CAPSULE | Refills: 1 | Status: SHIPPED | OUTPATIENT
Start: 2024-10-15

## 2024-10-15 NOTE — TELEPHONE ENCOUNTER
Patient caregiver Bobbi called stating patient  needs refills on his medicaiton tamsulosin sent to [facility].     Patient can be reached at [phone]

## 2024-10-16 NOTE — TELEPHONE ENCOUNTER
Wife called to see if the tamsulosin had been called in and I told her it was called in to [facility] in [place].

## 2024-12-12 ENCOUNTER — RESULTS FOLLOW-UP (OUTPATIENT)
Dept: CARDIOLOGY CLINIC | Facility: CLINIC | Age: 59
End: 2024-12-12

## 2024-12-12 ENCOUNTER — REMOTE DEVICE CLINIC VISIT (OUTPATIENT)
Dept: CARDIOLOGY CLINIC | Facility: CLINIC | Age: 59
End: 2024-12-12
Payer: COMMERCIAL

## 2024-12-12 DIAGNOSIS — Z95.0 CARDIAC PACEMAKER IN SITU: Primary | ICD-10-CM

## 2024-12-12 PROCEDURE — 93296 REM INTERROG EVL PM/IDS: CPT | Performed by: INTERNAL MEDICINE

## 2024-12-12 PROCEDURE — 93294 REM INTERROG EVL PM/LDLS PM: CPT | Performed by: INTERNAL MEDICINE

## 2024-12-12 NOTE — PROGRESS NOTES
Results for orders placed or performed in visit on 12/12/24   Cardiac EP device report    Narrative    SJM DC PM/NOT MRI CONDITIONAL  MERLIN TRANSMISSION: BATTERY VOLTAGE ADEQUATE (6.4-6.7 YRS). AP-95%, <1%. ALL AVAILABLE LEAD PARAMETERS WITHIN NORMAL LIMITS. NO SIGNIFICANT HIGH RATE EPISODES. NORMAL DEVICE FUNCTION. GV

## 2025-01-09 ENCOUNTER — OFFICE VISIT (OUTPATIENT)
Dept: CARDIOLOGY CLINIC | Facility: CLINIC | Age: 60
End: 2025-01-09
Payer: COMMERCIAL

## 2025-01-09 VITALS
HEIGHT: 70 IN | BODY MASS INDEX: 33.5 KG/M2 | SYSTOLIC BLOOD PRESSURE: 142 MMHG | RESPIRATION RATE: 14 BRPM | HEART RATE: 60 BPM | OXYGEN SATURATION: 97 % | WEIGHT: 234 LBS | DIASTOLIC BLOOD PRESSURE: 90 MMHG

## 2025-01-09 DIAGNOSIS — E66.01 CLASS 2 SEVERE OBESITY DUE TO EXCESS CALORIES WITH SERIOUS COMORBIDITY AND BODY MASS INDEX (BMI) OF 35.0 TO 35.9 IN ADULT (HCC): ICD-10-CM

## 2025-01-09 DIAGNOSIS — I10 HYPERTENSION, ESSENTIAL: ICD-10-CM

## 2025-01-09 DIAGNOSIS — N18.32 STAGE 3B CHRONIC KIDNEY DISEASE (HCC): ICD-10-CM

## 2025-01-09 DIAGNOSIS — I48.0 PAROXYSMAL A-FIB (HCC): ICD-10-CM

## 2025-01-09 DIAGNOSIS — E66.812 CLASS 2 SEVERE OBESITY DUE TO EXCESS CALORIES WITH SERIOUS COMORBIDITY AND BODY MASS INDEX (BMI) OF 35.0 TO 35.9 IN ADULT (HCC): ICD-10-CM

## 2025-01-09 DIAGNOSIS — I71.03 DISSECTION OF THORACOABDOMINAL AORTA (HCC): Primary | ICD-10-CM

## 2025-01-09 DIAGNOSIS — E78.2 COMBINED HYPERLIPIDEMIA: ICD-10-CM

## 2025-01-09 PROCEDURE — 99214 OFFICE O/P EST MOD 30 MIN: CPT

## 2025-01-09 RX ORDER — DIGOXIN 125 MCG
TABLET ORAL
Qty: 90 TABLET | Refills: 3 | Status: SHIPPED | OUTPATIENT
Start: 2025-01-09

## 2025-01-09 RX ORDER — SIMVASTATIN 20 MG
20 TABLET ORAL
Qty: 90 TABLET | Refills: 3 | Status: SHIPPED | OUTPATIENT
Start: 2025-01-09

## 2025-01-09 RX ORDER — METOPROLOL SUCCINATE 50 MG/1
75 TABLET, EXTENDED RELEASE ORAL DAILY
Qty: 135 TABLET | Refills: 3 | Status: SHIPPED | OUTPATIENT
Start: 2025-01-09

## 2025-01-09 RX ORDER — APIXABAN 5 MG/1
5 TABLET, FILM COATED ORAL 2 TIMES DAILY
Qty: 180 TABLET | Refills: 3 | Status: SHIPPED | OUTPATIENT
Start: 2025-01-09

## 2025-01-09 NOTE — ASSESSMENT & PLAN NOTE
Blood pressure well-controlled at home  Continue amlodipine 5 mg daily and metoprolol succinate 75 mg daily

## 2025-01-09 NOTE — ASSESSMENT & PLAN NOTE
Patient primarily atrial paced; no high rate episodes on device reports  History of SSS s/p PPM placement- follows with device clinic  [id number] 1  Rate/Rhythm control: Metoprolol succinate 75 mg daily, digoxin  AC: Eliquis 5 mg twice daily

## 2025-01-09 NOTE — PROGRESS NOTES
[facility] [place]  [address]  [place] [address]  Cardiology Office Note    Yonathan Herrera 59 y.o. male MRN: [id number]    01/09/25          Assessment/Plan:  Assessment & Plan  Paroxysmal A-fib (HCC)  Patient primarily atrial paced; no high rate episodes on device reports  History of SSS s/p PPM placement- follows with device clinic  [id number] 1  Rate/Rhythm control: Metoprolol succinate 75 mg daily, digoxin  AC: Eliquis 5 mg twice daily  Hypertension, essential  Blood pressure well-controlled at home  Continue amlodipine 5 mg daily and metoprolol succinate 75 mg daily  Combined hyperlipidemia  Continue simvastatin 20 mg daily  Class 2 severe obesity due to excess calories with serious comorbidity and body mass index (BMI) of 35.0 to 35.9 in adult ([facility])    Stage 3b chronic kidney disease (HCC)  Lab Results   Component Value Date    EGFR 37 (L) 11/12/2024    EGFR 40 (L) 10/18/2024    EGFR 37 (L) 06/18/2024    CREATININE 2.06 (H) 11/12/2024    CREATININE 1.90 (H) 10/18/2024    CREATININE 2.04 (H) 06/18/2024     Dissection of thoracoabdominal aorta (HCC)  S/p repair, remote history  Continue tight blood pressure control.         Follow up: 6 months or sooner as needed  All questions and concerns addressed.  Patient was advised to report any problems requiring medical attention.          1. Dissection of thoracoabdominal aorta (HCC)        2. Paroxysmal A-fib (HCC)  Eliquis 5 MG    digoxin (Digitek) 0.125 mg tablet      3. Hypertension, essential  metoprolol succinate (TOPROL-XL) 50 mg 24 hr tablet      4. Combined hyperlipidemia  simvastatin (ZOCOR) 20 mg tablet      5. Class 2 severe obesity due to excess calories with serious comorbidity and body mass index (BMI) of 35.0 to 35.9 in adult ([facility])        6. Stage 3b chronic kidney disease (HCC)            HPI: Yonathan Herrera is a 59 y.o. year old male with PMH of atrial fibrillation, sick sinus syndrome s/p PPM, hypertension,  hyperlipidemia, remote thoracoabdominal aortic dissection and repair who presents for routine follow-up. Patient is known to Dr. Good.    Patient appears to be doing well from a cardiac standpoint.Patient denies any chest pain, shortness of breath, palpitations, or lower extremity edema.  He denies any concerns at today's appointment.    Patient notes that he is pretty active and eats a relatively healthy diet. His BP is mildly elevated at today's appointment. He notes that he took his BP about a half hour prior to his appointment.     Patient follows with the device clinic. He denies any bleeding issues on Eliquis.      Patient was instructed to call the office or seek medical attention if any significant chest pain, shortness of breath, palpitations, or lower extremity swelling develop. All medications reviewed and patient is tolerating medications without side effects.     Social history:   Patient denies tobacco, significant alcohol, or recreational drug use.            Patient Active Problem List   Diagnosis    Paroxysmal A-fib (HCC)    Hypertension, essential    Chronic anticoagulation    Chest discomfort    Dyspnea on effort    Sick sinus syndrome ([facility])    Stage 3b chronic kidney disease (HCC)    Dissection of thoracoabdominal aorta ([facility])    Class 2 severe obesity due to excess calories with serious comorbidity and body mass index (BMI) of 35.0 to 35.9 in adult ([facility])       No Known Allergies      Current Outpatient Medications:     amLODIPine (NORVASC) 5 mg tablet, Take 1 tablet (5 mg total) by mouth daily, Disp: 90 tablet, Rfl: 3    Cholecalciferol 50 MCG (2000 UT) CAPS, Take 2 capsules by mouth, Disp: , Rfl:     Cyclobenzaprine HCl (FLEXERIL PO), 1 tablet at bedtime, Disp: , Rfl:     digoxin (Digitek) 0.125 mg tablet, 1 tab every other day, Disp: 90 tablet, Rfl: 3    Eliquis 5 MG, Take 1 tablet (5 mg total) by mouth 2 (two) times a day, Disp: 180 tablet, Rfl: 3    FLUoxetine (PROzac) 10 mg capsule, 10  mg daily , Disp: , Rfl: 0    hydrOXYzine HCL (ATARAX) 50 mg tablet, Take 1 tablet by mouth, Disp: , Rfl:     lisinopril (ZESTRIL) 2.5 mg tablet, Take 2.5 mg by mouth daily, Disp: , Rfl:     metoprolol succinate (TOPROL-XL) 50 mg 24 hr tablet, Take 1.5 tablets (75 mg total) by mouth daily, Disp: 135 tablet, Rfl: 3    omeprazole (PriLOSEC) 20 mg delayed release capsule, Take 20 mg by mouth daily, Disp: , Rfl: 0    ROZEREM 8 MG tablet, 8 mg daily at bedtime , Disp: , Rfl: 0    simvastatin (ZOCOR) 20 mg tablet, Take 1 tablet (20 mg total) by mouth daily at bedtime, Disp: 90 tablet, Rfl: 3    tamsulosin (FLOMAX) 0.4 mg, Take 1 capsule (0.4 mg total) by mouth daily with dinner, Disp: 90 capsule, Rfl: 1    ULORIC 40 MG tablet, Take 40 mg by mouth daily, Disp: , Rfl: 0    ziprasidone (GEODON) 40 mg capsule, 40 mg daily at bedtime , Disp: , Rfl: 0    Past Medical History:   Diagnosis Date    Abdominal aortic aneurysm (AAA) (HCC)     Abnormal ECG     Atrial fibrillation (HCC)     Cardiac arrhythmia     Chest pain     Coronary atherosclerosis of native coronary artery     Hyperlipidemia     Hypertension     Myocardial infarction (HCC)     Sinoatrial node dysfunction (HCC)     SOB (shortness of breath)        Family History   Problem Relation Age of Onset    No Known Problems Father     No Known Problems Mother        Past Surgical History:   Procedure Laterality Date    CARDIAC CATHETERIZATION      INSERT / REPLACE / REMOVE PACEMAKER      PELVIC AND PARA-AORTIC LYMPH NODE DISSECTION         Social History     Socioeconomic History    Marital status: Single     Spouse name: Not on file    Number of children: Not on file    Years of education: Not on file    Highest education level: Not on file   Occupational History    Not on file   Tobacco Use    Smoking status: Never     Passive exposure: Never    Smokeless tobacco: Never   Vaping Use    Vaping status: Never Used   Substance and Sexual Activity    Alcohol use: Never    Drug  use: Never    Sexual activity: Yes     Partners: Female   Other Topics Concern    Not on file   Social History Narrative    Not on file     Social Drivers of Health     Financial Resource Strain: Low Risk  (8/18/2023)    [facility]    Overall Financial Resource Strain (CARDIA)     Difficulty of Paying Living Expenses: Not hard at all   Food Insecurity: No Food Insecurity (8/18/2023)    [facility]    Hunger Vital Sign     Worried About Running Out of Food in the Last Year: Never true     Ran Out of Food in the Last Year: Never true   Transportation Needs: No Transportation Needs (8/18/2023)    Received from [facility]    PRAPARE - Transportation     Lack of Transportation (Medical): No     Lack of Transportation (Non-Medical): No   Physical Activity: Not on file   Stress: Not on file   Social Connections: Unknown (6/18/2024)    Received from [facility] [employer]     How often do you feel lonely or isolated from those around you? (Adult - for ages 18 years and over): Not on file   Intimate Partner Violence: Not At Risk (8/18/2023)    Received from [facility]    Humiliation, Afraid, Rape, and Kick questionnaire     Fear of Current or Ex-Partner: No     Emotionally Abused: No     Physically Abused: No     Sexually Abused: No   Housing Stability: Low Risk  (8/18/2023)    Received from [facility]    Housing Stability Vital Sign     Unable to Pay for Housing in the Last Year: No     Number of Places Lived in the Last Year: 1     Unstable Housing in the Last Year: No       Review of symptoms:   Review of Systems   Constitutional:  Negative for chills, diaphoresis and fever.   Respiratory:  Negative for cough, chest tightness and shortness of breath.   "  Cardiovascular:  Negative for chest pain, palpitations and leg swelling.   Gastrointestinal:  Negative for abdominal distention, blood in stool, nausea and vomiting.   Genitourinary:  Negative for difficulty urinating.   Musculoskeletal:  Negative for arthralgias and back pain.   Neurological:  Negative for dizziness, syncope, light-headedness and headaches.   Psychiatric/Behavioral:  Negative for agitation and confusion. The patient is not nervous/anxious.         Vitals: /90 (BP Location: Left arm, Patient Position: Sitting, Cuff Size: Standard)   Pulse 60   Resp 14   Ht 5' 10\" (1.778 m)   Wt 106 kg (234 lb)   SpO2 97%   BMI 33.58 kg/m²         Physical Exam:     Physical Exam  Vitals and nursing note reviewed.   Constitutional:       General: He is not in acute distress.     Appearance: He is well-developed.   HENT:      Head: Normocephalic and atraumatic.   Eyes:      Conjunctiva/sclera: Conjunctivae normal.   Neck:      Vascular: No carotid bruit.   Cardiovascular:      Rate and Rhythm: Normal rate and regular rhythm.      Heart sounds: Normal heart sounds. No murmur heard.  Pulmonary:      Effort: Pulmonary effort is normal. No respiratory distress.      Breath sounds: Normal breath sounds.   Abdominal:      Palpations: Abdomen is soft.      Tenderness: There is no abdominal tenderness.   Musculoskeletal:         General: No swelling.      Cervical back: Neck supple.      Right lower leg: No edema.      Left lower leg: No edema.   Skin:     General: Skin is warm and dry.      Capillary Refill: Capillary refill takes less than 2 seconds.   Neurological:      Mental Status: He is alert and oriented to person, place, and time.   Psychiatric:         Mood and Affect: Mood normal.            Thank you for allowing me to participate in the care and evaluation of your patient.  Should you have any questions, please feel free to contact me.      "

## 2025-01-09 NOTE — ASSESSMENT & PLAN NOTE
Lab Results   Component Value Date    EGFR 37 (L) 11/12/2024    EGFR 40 (L) 10/18/2024    EGFR 37 (L) 06/18/2024    CREATININE 2.06 (H) 11/12/2024    CREATININE 1.90 (H) 10/18/2024    CREATININE 2.04 (H) 06/18/2024

## 2025-03-12 ENCOUNTER — REMOTE DEVICE CLINIC VISIT (OUTPATIENT)
Dept: CARDIOLOGY CLINIC | Facility: CLINIC | Age: 60
End: 2025-03-12
Payer: COMMERCIAL

## 2025-03-12 ENCOUNTER — TELEPHONE (OUTPATIENT)
Dept: CARDIOLOGY CLINIC | Facility: CLINIC | Age: 60
End: 2025-03-12

## 2025-03-12 DIAGNOSIS — Z95.0 PRESENCE OF PERMANENT CARDIAC PACEMAKER: Primary | ICD-10-CM

## 2025-03-12 PROCEDURE — 93296 REM INTERROG EVL PM/IDS: CPT | Performed by: INTERNAL MEDICINE

## 2025-03-12 PROCEDURE — 93294 REM INTERROG EVL PM/LDLS PM: CPT | Performed by: INTERNAL MEDICINE

## 2025-03-12 NOTE — PROGRESS NOTES
Results for orders placed or performed in visit on 03/12/25   Cardiac EP device report    Narrative    SJM DC PM/NOT MRI CONDITIONAL  MERLIN TRANSMISSION: BATTERY VOLTAGE ADEQUATE (5.9 - 6.2 YRS). AP 93%  <1% . ALL AVAILABLE LEAD PARAMETERS WITHIN NORMAL LIMITS. NO SIGNIFICANT HIGH RATE EPISODES. 1 AMS EPISODE, DURATION 42 SECS WITH PAF ON EGM. AT/AF BURDEN <1%. PATIENT TAKING ELIQUIS, DIGOXIN, METOPROLOL SUCC. NORMAL DEVICE FUNCTION.  ES

## 2025-03-14 ENCOUNTER — RESULTS FOLLOW-UP (OUTPATIENT)
Dept: NON INVASIVE DIAGNOSTICS | Facility: HOSPITAL | Age: 60
End: 2025-03-14

## 2025-03-30 DIAGNOSIS — I10 HYPERTENSION, ESSENTIAL: ICD-10-CM

## 2025-03-30 DIAGNOSIS — I48.0 PAROXYSMAL A-FIB (HCC): ICD-10-CM

## 2025-03-31 RX ORDER — DIGOXIN 125 MCG
125 TABLET ORAL EVERY OTHER DAY
Qty: 90 TABLET | Refills: 3 | OUTPATIENT
Start: 2025-03-31

## 2025-03-31 RX ORDER — METOPROLOL SUCCINATE 50 MG/1
75 TABLET, EXTENDED RELEASE ORAL DAILY
Qty: 135 TABLET | Refills: 3 | OUTPATIENT
Start: 2025-03-31

## 2025-04-08 DIAGNOSIS — N40.1 BENIGN LOCALIZED PROSTATIC HYPERPLASIA WITH LOWER URINARY TRACT SYMPTOMS (LUTS): ICD-10-CM

## 2025-04-08 NOTE — TELEPHONE ENCOUNTER
PT scheduled with [facility] on 5/29/25 at 9:20.    Please send a courtesy refill for pt to [facility] in [facility]

## 2025-04-09 RX ORDER — TAMSULOSIN HYDROCHLORIDE 0.4 MG/1
0.4 CAPSULE ORAL
Qty: 90 CAPSULE | Refills: 1 | Status: SHIPPED | OUTPATIENT
Start: 2025-04-09

## 2025-05-28 NOTE — PROGRESS NOTES
Name: Yonathan Herrera      : 1965      MRN: [id number]  Encounter Provider: Moni Arora PA-C  Encounter Date: 2025   Encounter department: [facility] UROLOGY [place]  :  Assessment & Plan  Benign prostatic hyperplasia with lower urinary tract symptoms, symptom details unspecified  - Well managed on Flomax  - Urine dip negative for blood, leuk, or nitrites   - PVR 45 mL   Orders:    POCT Measure PVR    POCT urine dip    tamsulosin (FLOMAX) 0.4 mg; Take 1 capsule (0.4 mg total) by mouth daily with dinner    Screening for prostate cancer         Increased prostate specific antigen (PSA) velocity  Component 05/12/25 04/07/23 12/30/21 10/07/20   PSA, Total 2.24  0.99  0.88 1.28     - ONIEL today limited   - Obtain repeat PSA in 6-8 weeks. If progressive increase, discussed prostate MRI for further evaluation  Orders:    PSA Total, Diagnostic; Future        History of Present Illness   Yonathan Herrera is a 60 y.o. male who presents for follow up of BPH. Has been managed on Flomax. History of recurrent UTIs in the past. No recent positive urine cultures. Prior negative cystoscopy in . Denies any new complaints or issues. No bothersome urinary symptoms currently. Denies family history of  malignancy.     AUA SYMPTOM SCORE      Flowsheet Row Most Recent Value   AUA SYMPTOM SCORE    How often have you had a sensation of not emptying your bladder completely after you finished urinating? 1   How often have you had to urinate again less than two hours after you finished urinating? 2   How often have you found you stopped and started again several times when you urinate? 1   How often have you found it difficult to postpone urination? 0   How often have you had a weak urinary stream? 0   How often have you had to push or strain to begin urination? 0   How many times did you most typically get up to urinate from the time you went to bed at night until the time you got up in the morning? 1   Quality  "of Life: If you were to spend the rest of your life with your urinary condition just the way it is now, how would you feel about that? 2   AUA SYMPTOM SCORE 5            Review of Systems   Constitutional:  Negative for chills and fever.   Respiratory:  Negative for shortness of breath.    Cardiovascular:  Negative for chest pain.   Gastrointestinal:  Negative for abdominal pain.   Genitourinary:  Negative for difficulty urinating, dysuria, flank pain, frequency, hematuria and urgency.   Neurological:  Negative for dizziness.      Objective   There were no vitals taken for this visit.    Physical Exam  Constitutional:       Appearance: Normal appearance.   HENT:      Head: Normocephalic and atraumatic.      Right Ear: External ear normal.      Left Ear: External ear normal.      Nose: Nose normal.     Eyes:      General: No scleral icterus.     Conjunctiva/sclera: Conjunctivae normal.       Cardiovascular:      Pulses: Normal pulses.   Pulmonary:      Effort: Pulmonary effort is normal.   Genitourinary:     Comments: Limited ONIEL today- no nodularity palpated on portion of prostate felt    Musculoskeletal:         General: Normal range of motion.      Cervical back: Normal range of motion.     Skin:     General: Skin is warm and dry.     Neurological:      General: No focal deficit present.      Mental Status: He is alert and oriented to person, place, and time.     Psychiatric:         Mood and Affect: Mood normal.         Behavior: Behavior normal.         Thought Content: Thought content normal.         Judgment: Judgment normal.          Results   No results found for: \"PSA\"  Lab Results   Component Value Date    CALCIUM 9.1 05/12/2025     01/20/2017    K 5 05/12/2025    CO2 28 05/12/2025     05/12/2025    BUN 26 05/12/2025    CREATININE 1.9 (H) 05/12/2025     No results found for: \"WBC\", \"HGB\", \"HCT\", \"MCV\", \"PLT\"    Office Urine Dip  No results found for this or any previous visit (from the past " hour).

## 2025-05-29 ENCOUNTER — OFFICE VISIT (OUTPATIENT)
Dept: UROLOGY | Facility: CLINIC | Age: 60
End: 2025-05-29
Payer: COMMERCIAL

## 2025-05-29 VITALS
HEART RATE: 79 BPM | TEMPERATURE: 97.7 F | WEIGHT: 233 LBS | HEIGHT: 70 IN | DIASTOLIC BLOOD PRESSURE: 82 MMHG | SYSTOLIC BLOOD PRESSURE: 130 MMHG | OXYGEN SATURATION: 99 % | BODY MASS INDEX: 33.36 KG/M2

## 2025-05-29 DIAGNOSIS — R97.20 INCREASED PROSTATE SPECIFIC ANTIGEN (PSA) VELOCITY: ICD-10-CM

## 2025-05-29 DIAGNOSIS — Z12.5 SCREENING FOR PROSTATE CANCER: ICD-10-CM

## 2025-05-29 DIAGNOSIS — N40.1 BENIGN PROSTATIC HYPERPLASIA WITH LOWER URINARY TRACT SYMPTOMS, SYMPTOM DETAILS UNSPECIFIED: Primary | ICD-10-CM

## 2025-05-29 LAB
POST-VOID RESIDUAL VOLUME, ML POC: 45 ML
SL AMB  POCT GLUCOSE, UA: NORMAL
SL AMB LEUKOCYTE ESTERASE,UA: NORMAL
SL AMB POCT BILIRUBIN,UA: NORMAL
SL AMB POCT BLOOD,UA: NORMAL
SL AMB POCT CLARITY,UA: NORMAL
SL AMB POCT COLOR,UA: YELLOW
SL AMB POCT KETONES,UA: NORMAL
SL AMB POCT NITRITE,UA: NORMAL
SL AMB POCT PH,UA: 5
SL AMB POCT SPECIFIC GRAVITY,UA: 1.01
SL AMB POCT URINE PROTEIN: NORMAL
SL AMB POCT UROBILINOGEN: 0.2

## 2025-05-29 PROCEDURE — 81002 URINALYSIS NONAUTO W/O SCOPE: CPT | Performed by: PHYSICIAN ASSISTANT

## 2025-05-29 PROCEDURE — 99214 OFFICE O/P EST MOD 30 MIN: CPT | Performed by: PHYSICIAN ASSISTANT

## 2025-05-29 PROCEDURE — 51798 US URINE CAPACITY MEASURE: CPT | Performed by: PHYSICIAN ASSISTANT

## 2025-05-29 RX ORDER — LISINOPRIL 10 MG/1
1 TABLET ORAL DAILY
COMMUNITY
Start: 2025-03-25

## 2025-05-29 RX ORDER — LISINOPRIL 40 MG/1
TABLET ORAL
COMMUNITY
Start: 2025-05-27 | End: 2025-05-29

## 2025-05-29 RX ORDER — TAMSULOSIN HYDROCHLORIDE 0.4 MG/1
0.4 CAPSULE ORAL
Qty: 90 CAPSULE | Refills: 3 | Status: SHIPPED | OUTPATIENT
Start: 2025-05-29

## 2025-06-19 ENCOUNTER — TELEPHONE (OUTPATIENT)
Dept: CARDIOLOGY CLINIC | Facility: CLINIC | Age: 60
End: 2025-06-19

## 2025-06-20 ENCOUNTER — IN-CLINIC DEVICE VISIT (OUTPATIENT)
Dept: CARDIOLOGY CLINIC | Facility: CLINIC | Age: 60
End: 2025-06-20

## 2025-06-20 DIAGNOSIS — I49.5 SSS (SICK SINUS SYNDROME) (HCC): Primary | ICD-10-CM

## 2025-06-20 NOTE — PROGRESS NOTES
Results for orders placed or performed in visit on 06/20/25   Cardiac EP device report    Narrative    SJM DC PM/NOT MRI CONDITIONAL  DEVICE INTERROGATED IN THE [place] OFFICE: BATTERY VOLTAGE ADEQUATE (5.8 YRS). AP: 91%. : <1%. ALL LEAD PARAMETERS WITHIN NORMAL LIMITS. NO SIGNIFICANT HIGH RATE EPISODES. NO PROGRAMMING CHANGES MADE TO DEVICE PARAMETERS. NORMAL DEVICE FUNCTION. CH

## 2025-06-23 ENCOUNTER — TELEPHONE (OUTPATIENT)
Age: 60
End: 2025-06-23

## 2025-06-23 ENCOUNTER — NURSE TRIAGE (OUTPATIENT)
Age: 60
End: 2025-06-23

## 2025-06-23 NOTE — TELEPHONE ENCOUNTER
"Caller: Deena (Niece of patient). Not on Medical Communication Consent Form.    Doctor: Dr. Echols ([place])    Reason for call: Deena states patient was taken off the Amlodipine blood pressure medication in March 2025 by the kidney doctor, and ever since patient has been having issues such as recent noise bleeds. Patient wanted to alert Dr. Echols. Patient knows 7/9/25 appt with Dr. MURILLO is scheduled. Patient requested to speak with CTS RN. Attempted to transfer but Lukas was \"spinning\" and would not connect. Please call back patient to discuss.    Call back#: [phone]  "

## 2025-06-23 NOTE — TELEPHONE ENCOUNTER
"Caller: Deena (Niece of patient). Not on Communication Consent Form    Doctor: Dr. Echols    Reason for call: Caller: Note: I sent this encounter to Cardio [facility] Clinical but also decided to send as Call HUB based on the clinical message from Deena about recent nose bleeds.     Doctor: Dr. Echols ([place])     Reason for call: Deena states patient was taken off the Amlodipine blood pressure medication in March 2025 by the kidney doctor, and ever since patient has been having issues such as recent nose bleeds. Patient wanted to alert Dr. Echols. Patient knows 7/9/25 appt with Dr. MURILLO is scheduled. Patient requested to speak with CTS RN. Attempted to transfer but Lukas was \"spinning\" and would not connect. Please call back patient to discuss.     Call back#: [phone]  "

## 2025-06-23 NOTE — TELEPHONE ENCOUNTER
Reason for Disposition  • Third attempt to contact caller AND no contact made. Phone number verified.    Answer Assessment - Initial Assessment Questions  Attempted to contact pt/niece for third time, no answer.    Protocols used: No Contact or Duplicate Contact Call-Adult-[place]

## 2025-07-02 ENCOUNTER — TELEPHONE (OUTPATIENT)
Age: 60
End: 2025-07-02

## 2025-07-02 NOTE — TELEPHONE ENCOUNTER
Caller: Deena Herrera    Doctor: Dr. Good    Reason for call: pt's niece calling to request Dr. Good put in a script for amlodipine. I couldn't find it in his med list to request a refill. Please let pt know when script has been put in.    Call back#: [phone]

## 2025-07-02 NOTE — TELEPHONE ENCOUNTER
Spoke with pt and Dr. MURILLO's message was relayed in details. Pt expressed understanding.    Pt has an upcoming appt with Dr. MURILLO on 07/09/25. Do you still need pt to come in for a nurse visit.    Please advise.    Thanks!

## 2025-07-02 NOTE — TELEPHONE ENCOUNTER
I do not see amlodipine in the med list.    Please check with patient's niece as to who was prescribing physician.    He used to be on that about 5 years ago.    Thank you

## 2025-07-02 NOTE — TELEPHONE ENCOUNTER
Okay    If patient has a blood pressure machine, they can check BPs at home -midmorning check and submit log    To report any elevated blood pressures with systolic above 140 and diastolic above 85.    Patient can also be scheduled for nurse visit in the next couple weeks to check BP as well as bring his BP log from home.

## 2025-07-02 NOTE — TELEPHONE ENCOUNTER
Received a call from Miriam from [facility] following up on the Amlodipine.    Dr Jones had pt on a BP monitor program- BP were within normal range so back in November they had discontinued the Amlodipine.  Annloren is requesting that cardiology monitor BP and requesting amlodipine.     Please review

## 2025-07-09 ENCOUNTER — OFFICE VISIT (OUTPATIENT)
Dept: CARDIOLOGY CLINIC | Facility: CLINIC | Age: 60
End: 2025-07-09
Payer: COMMERCIAL

## 2025-07-09 VITALS
OXYGEN SATURATION: 97 % | HEIGHT: 70 IN | BODY MASS INDEX: 33.07 KG/M2 | DIASTOLIC BLOOD PRESSURE: 82 MMHG | SYSTOLIC BLOOD PRESSURE: 126 MMHG | WEIGHT: 231 LBS | RESPIRATION RATE: 16 BRPM | HEART RATE: 66 BPM

## 2025-07-09 DIAGNOSIS — I48.0 PAROXYSMAL A-FIB (HCC): Primary | ICD-10-CM

## 2025-07-09 DIAGNOSIS — E78.2 COMBINED HYPERLIPIDEMIA: ICD-10-CM

## 2025-07-09 DIAGNOSIS — I10 HYPERTENSION, ESSENTIAL: ICD-10-CM

## 2025-07-09 DIAGNOSIS — I71.03 DISSECTION OF THORACOABDOMINAL AORTA (HCC): ICD-10-CM

## 2025-07-09 DIAGNOSIS — Z79.01 CHRONIC ANTICOAGULATION: ICD-10-CM

## 2025-07-09 DIAGNOSIS — I49.5 SICK SINUS SYNDROME (HCC): ICD-10-CM

## 2025-07-09 PROCEDURE — 99214 OFFICE O/P EST MOD 30 MIN: CPT | Performed by: INTERNAL MEDICINE

## 2025-07-09 RX ORDER — DIGOXIN 125 MCG
TABLET ORAL
Qty: 90 TABLET | Refills: 3 | Status: SHIPPED | OUTPATIENT
Start: 2025-07-09

## 2025-07-09 RX ORDER — METOPROLOL SUCCINATE 50 MG/1
75 TABLET, EXTENDED RELEASE ORAL DAILY
Qty: 135 TABLET | Refills: 3 | Status: SHIPPED | OUTPATIENT
Start: 2025-07-09

## 2025-07-09 RX ORDER — LISINOPRIL 40 MG/1
40 TABLET ORAL DAILY
Qty: 90 TABLET | Refills: 3 | Status: SHIPPED | OUTPATIENT
Start: 2025-07-09

## 2025-07-09 RX ORDER — LISINOPRIL 40 MG/1
40 TABLET ORAL DAILY
COMMUNITY
End: 2025-07-09 | Stop reason: SDUPTHER

## 2025-07-09 RX ORDER — SIMVASTATIN 20 MG
20 TABLET ORAL
Qty: 90 TABLET | Refills: 3 | Status: SHIPPED | OUTPATIENT
Start: 2025-07-09

## 2025-07-09 RX ORDER — APIXABAN 5 MG/1
5 TABLET, FILM COATED ORAL 2 TIMES DAILY
Qty: 180 TABLET | Refills: 3 | Status: SHIPPED | OUTPATIENT
Start: 2025-07-09

## 2025-07-09 RX ORDER — AMLODIPINE BESYLATE 2.5 MG/1
2.5 TABLET ORAL DAILY
Qty: 90 TABLET | Refills: 3 | Status: SHIPPED | OUTPATIENT
Start: 2025-07-09

## 2025-07-09 NOTE — ASSESSMENT & PLAN NOTE
History of dissection with treatment at [facility] many years ago.  Patient has done well.

## 2025-07-09 NOTE — PROGRESS NOTES
[facility] [place]  [address]  [place] [address]  Cardiology Follow Up    Yonathan LEMON [place]  1965  [id number]      Assessment & Plan  Paroxysmal A-fib (HCC)  Risks and benefits  and alternatives of anticoagulation to prevent thromboembolic risk from atrial fibrillation discussed at length.  Patient to report any bleeding issues.  Patient is on Eliquis.  Hypertension, essential  Blood pressures today are stable.  Patient and family have been instructed to check blood pressure readings and submit log to see if any adjustment to antihypertensive regimen is indicated.  Medications reviewed.  Sick sinus syndrome (HCC)  Continue to follow-up with pacemaker clinic.  Previous pacemaker interrogation data reviewed.  Dissection of thoracoabdominal aorta (HCC)  History of dissection with treatment at [facility] many years ago.  Patient has done well.  Chronic anticoagulation  Continue Eliquis.  Patient to report any bleeding issues.  Combined hyperlipidemia  Continue simvastatin.  Continue diet and is factor modification.    Follow-up in 9 months or earlier as needed.  Patient and family agreeable with the plan of care.       Chief Complaint   Patient presents with    Follow-up       Interval History:   Patient presents for follow-up visit.  Patient denies any history of chest pain shortness of breath.  Patient denies any history of leg edema or orthopnea PND.  No history of presyncope syncope.  Patient states compliance with the present list of medications.  Patient denies any bleeding issues.  Patient is followed by nephrology for CKD.  Patient is also followed by the device clinic for pacemaker.    Problem List[1]  Past Medical History[2]  Social History     Socioeconomic History    Marital status: Single     Spouse name: Not on file    Number of children: Not on file    Years of education: Not on file    Highest education level: Not on file   Occupational History    Not on file    Tobacco Use    Smoking status: Never     Passive exposure: Never    Smokeless tobacco: Never   Vaping Use    Vaping status: Never Used   Substance and Sexual Activity    Alcohol use: Never    Drug use: Never    Sexual activity: Yes     Partners: Female   Other Topics Concern    Not on file   Social History Narrative    Not on file     Social Drivers of Health     Financial Resource Strain: Not At Risk (2/24/2025)    Received from [facility]    Financial Insecurity     In the last 12 months did you skip medications to save money?: No     In the last 12 months was there a time when you needed to see a doctor but could not because of cost?: No   Food Insecurity: No Food Insecurity (2/24/2025)    Received from [facility]    Food Insecurity     In the last 12 months did you ever eat less than you felt you should because there wasn't enough money for food?: No   Transportation Needs: No Transportation Needs (2/24/2025)    Received from [facility]    Transportation Needs     In the last 12 months have you ever had to go without healthcare because you didn't have a way to get there?: No   Physical Activity: Not on file   Stress: Not on file   Social Connections: Socially Integrated (2/24/2025)    Received from [facility]    Social Connection     Do you often feel lonely?: No   Intimate Partner Violence: Not At Risk (8/18/2023)    Received from [facility]    Humiliation, Afraid, Rape, and Kick questionnaire     Fear of Current or Ex-Partner: No     Emotionally Abused: No     Physically Abused: No     Sexually Abused: No   Housing Stability: Not At Risk (2/24/2025)    Received from [facility]    Housing Stability     Are you worried that in the next 2 months you may not have stable housing?: No      Family History[3]  Past Surgical History[4]  Current Medications[5]  No Known Allergies    Labs:  Office Visit on  "05/29/2025   Component Date Value    POST-VOID RESIDUAL VOLUM* 05/29/2025 45     LEUKOCYTE ESTERASE,UA 05/29/2025 -     NITRITE,UA 05/29/2025 -     SL AMB POCT UROBILINOGEN 05/29/2025 0.2     POCT URINE PROTEIN 05/29/2025 +      PH,UA 05/29/2025 5.0     BLOOD,UA 05/29/2025 -     SPECIFIC GRAVITY,UA 05/29/2025 1.015     KETONES,UA 05/29/2025 -     BILIRUBIN,UA 05/29/2025 -     GLUCOSE, UA 05/29/2025 -      COLOR,UA 05/29/2025 Yellow     CLARITY,UA 05/29/2025 [place]      Imaging: Cardiac EP device report  Result Date: 6/20/2025  Narrative: MEMO DC PM/NOT MRI CONDITIONAL DEVICE INTERROGATED IN THE [place] OFFICE: BATTERY VOLTAGE ADEQUATE (5.8 YRS). AP: 91%. : <1%. ALL LEAD PARAMETERS WITHIN NORMAL LIMITS. NO SIGNIFICANT HIGH RATE EPISODES. NO PROGRAMMING CHANGES MADE TO DEVICE PARAMETERS. NORMAL DEVICE FUNCTION.        Review of Systems:  Review of Systems  REVIEW OF SYSTEMS:  Constitutional:  Denies fever or chills   Eyes:  Denies change in visual acuity   HENT:  Denies nasal congestion or sore throat   Respiratory:  Denies cough or shortness of breath   Cardiovascular:  Denies chest pain or edema   GI:  Denies abdominal pain, nausea, vomiting, bloody stools or diarrhea   :  Denies dysuria, frequency, difficulty in micturition and nocturia  Musculoskeletal:  Denies back pain or joint pain   Neurologic:  Denies headache, focal weakness or sensory changes   Endocrine:  Denies polyuria or polydipsia   Lymphatic:  Denies swollen glands   Psychiatric:  Denies depression or anxiety    Physical Exam:    /82 (BP Location: Left arm, Patient Position: Sitting, Cuff Size: Standard)   Pulse 66   Resp 16   Ht 5' 10\" (1.778 m)   Wt 105 kg (231 lb)   SpO2 97%   BMI 33.15 kg/m²     Physical Exam  PHYSICAL EXAM:  General:  Patient is not in acute distress   Head: Normocephalic, Atraumatic.  HEENT:  Both pupils normal-size atraumatic, normocephalic, nonicteric  Neck:  JVP not raised. Trachea central. No " carotid bruit  Respiratory:  normal breath sounds no crackles. no rhonchi  Cardiovascular:  Regular rate and rhythm no S3 no murmurs  GI:  Abdomen soft nontender. No organomegaly.   Lymphatic:  No cervical or inguinal lymphadenopathy  Neurologic:  Patient is awake alert, oriented . Grossly nonfocal  Extremities no edema             [1]   Patient Active Problem List  Diagnosis    Paroxysmal A-fib (HCC)    Hypertension, essential    Chronic anticoagulation    Chest discomfort    Dyspnea on effort    Sick sinus syndrome (HCC)    Stage 3b chronic kidney disease (HCC)    Dissection of thoracoabdominal aorta (HCC)    Class 2 severe obesity due to excess calories with serious comorbidity and body mass index (BMI) of 35.0 to 35.9 in adult (HCC)   [2]   Past Medical History:  Diagnosis Date    Abdominal aortic aneurysm (AAA) (HCC)     Abnormal ECG     Atrial fibrillation (HCC)     Cardiac arrhythmia     Chest pain     Coronary atherosclerosis of native coronary artery     Hyperlipidemia     Hypertension     Myocardial infarction (HCC)     Sinoatrial node dysfunction (HCC)     SOB (shortness of breath)    [3]   Family History  Problem Relation Name Age of Onset    No Known Problems Father      No Known Problems Mother     [4]   Past Surgical History:  Procedure Laterality Date    CARDIAC CATHETERIZATION      INSERT / REPLACE / REMOVE PACEMAKER      PELVIC AND PARA-AORTIC LYMPH NODE DISSECTION     [5]   Current Outpatient Medications:     Cyclobenzaprine HCl (FLEXERIL PO), , Disp: , Rfl:     digoxin (Digitek) 0.125 mg tablet, 1 tab every other day, Disp: 90 tablet, Rfl: 3    Eliquis 5 MG, Take 1 tablet (5 mg total) by mouth 2 (two) times a day, Disp: 180 tablet, Rfl: 3    FLUoxetine (PROzac) 10 mg capsule, 10 mg in the morning., Disp: , Rfl: 0    hydrOXYzine HCL (ATARAX) 50 mg tablet, Take 50 mg by mouth every 4 (four) hours as needed, Disp: , Rfl:     lisinopril (ZESTRIL) 40 mg tablet, Take 40 mg by mouth daily, Disp: , Rfl:      metoprolol succinate (TOPROL-XL) 50 mg 24 hr tablet, Take 1.5 tablets (75 mg total) by mouth daily, Disp: 135 tablet, Rfl: 3    omeprazole (PriLOSEC) 20 mg delayed release capsule, Take 20 mg by mouth in the morning., Disp: , Rfl: 0    ROZEREM 8 MG tablet, 8 mg daily at bedtime, Disp: , Rfl: 0    simvastatin (ZOCOR) 20 mg tablet, Take 1 tablet (20 mg total) by mouth daily at bedtime, Disp: 90 tablet, Rfl: 3    tamsulosin (FLOMAX) 0.4 mg, Take 1 capsule (0.4 mg total) by mouth daily with dinner, Disp: 90 capsule, Rfl: 3    ULORIC 40 MG tablet, Take 40 mg by mouth in the morning., Disp: , Rfl: 0    ziprasidone (GEODON) 40 mg capsule, 40 mg daily at bedtime, Disp: , Rfl: 0

## 2025-07-09 NOTE — ASSESSMENT & PLAN NOTE
Blood pressures today are stable.  Patient and family have been instructed to check blood pressure readings and submit log to see if any adjustment to antihypertensive regimen is indicated.  Medications reviewed.

## 2025-07-09 NOTE — ASSESSMENT & PLAN NOTE
Risks and benefits  and alternatives of anticoagulation to prevent thromboembolic risk from atrial fibrillation discussed at length.  Patient to report any bleeding issues.  Patient is on Eliquis.

## 2025-07-10 ENCOUNTER — TELEPHONE (OUTPATIENT)
Age: 60
End: 2025-07-10

## 2025-07-10 NOTE — TELEPHONE ENCOUNTER
Pt under care of:  Moni  Office Location: [place]    Insurance   Current Insurance?[facility] for life   Insurance E-verified? yes      History  Last Seen 05/29/25     (include Follow Up recommendations of last visit- see Office Visit - Instructions): followup in a year.     Pt calling due to: Patient's niece called stating he had his psa done today at [facility] and wanted Moni to be aware and let him know results.     Pt can be reached at:[phone]    Appointment Details   Date:  05/29/26    Time:  920 am    Location:  [facility]     Provider:  Phil     Does the appointment need further review?

## 2025-07-23 ENCOUNTER — TELEPHONE (OUTPATIENT)
Dept: CARDIOLOGY CLINIC | Facility: CLINIC | Age: 60
End: 2025-07-23

## 2025-07-23 ENCOUNTER — HOSPITAL ENCOUNTER (OUTPATIENT)
Dept: NON INVASIVE DIAGNOSTICS | Facility: CLINIC | Age: 60
Discharge: HOME | End: 2025-07-23
Attending: INTERNAL MEDICINE
Payer: COMMERCIAL

## 2025-07-23 ENCOUNTER — RESULTS FOLLOW-UP (OUTPATIENT)
Dept: CARDIOLOGY CLINIC | Facility: CLINIC | Age: 60
End: 2025-07-23

## 2025-07-23 VITALS
HEART RATE: 65 BPM | BODY MASS INDEX: 33.07 KG/M2 | HEIGHT: 70 IN | SYSTOLIC BLOOD PRESSURE: 126 MMHG | DIASTOLIC BLOOD PRESSURE: 82 MMHG | WEIGHT: 231 LBS

## 2025-07-23 DIAGNOSIS — I10 HYPERTENSION, ESSENTIAL: ICD-10-CM

## 2025-07-23 DIAGNOSIS — I48.0 PAROXYSMAL A-FIB (HCC): ICD-10-CM

## 2025-07-23 LAB
AORTIC ROOT: 3.2 CM
ASCENDING AORTA: 3.5 CM
BSA FOR ECHO PROCEDURE: 2.22 M2
DOP CALC LVOT AREA: 3.14 CM2
DOP CALC LVOT DIAMETER: 2 CM
E WAVE DECELERATION TIME: 288 MS
E/A RATIO: 0.59
FRACTIONAL SHORTENING: 39 (ref 28–44)
INTERVENTRICULAR SEPTUM IN DIASTOLE (PARASTERNAL SHORT AXIS VIEW): 0.8 CM
INTERVENTRICULAR SEPTUM: 0.8 CM (ref 0.6–1.1)
LAAS-AP2: 25.7 CM2
LAAS-AP4: 17.3 CM2
LEFT ATRIUM SIZE: 4 CM
LEFT ATRIUM VOLUME (MOD BIPLANE): 59 ML
LEFT ATRIUM VOLUME INDEX (MOD BIPLANE): 26.6 ML/M2
LEFT INTERNAL DIMENSION IN SYSTOLE: 3.3 CM (ref 2.1–4)
LEFT VENTRICULAR INTERNAL DIMENSION IN DIASTOLE: 5.4 CM (ref 3.5–6)
LEFT VENTRICULAR POSTERIOR WALL IN END DIASTOLE: 1.1 CM
LEFT VENTRICULAR STROKE VOLUME: 98 ML
LV EF US.2D.A4C+ESTIMATED: 60 %
LVSV (TEICH): 98 ML
MV E'TISSUE VEL-SEP: 7 CM/S
MV PEAK A VEL: 0.79 M/S
MV PEAK E VEL: 47 CM/S
MV STENOSIS PRESSURE HALF TIME: 84 MS
MV VALVE AREA P 1/2 METHOD: 2.62
RIGHT ATRIUM AREA SYSTOLE A4C: 15.7 CM2
SL CV LEFT ATRIUM LENGTH A2C: 5.8 CM
SL CV LV EF: 60
SL CV PED ECHO LEFT VENTRICLE DIASTOLIC VOLUME (MOD BIPLANE) 2D: 144 ML
SL CV PED ECHO LEFT VENTRICLE SYSTOLIC VOLUME (MOD BIPLANE) 2D: 46 ML
TR MAX PG: 34 MMHG
TR PEAK VELOCITY: 2.9 M/S
TRICUSPID ANNULAR PLANE SYSTOLIC EXCURSION: 1.7 CM
TRICUSPID VALVE PEAK REGURGITATION VELOCITY: 2.92 M/S

## 2025-07-23 PROCEDURE — 93306 TTE W/DOPPLER COMPLETE: CPT | Performed by: STUDENT IN AN ORGANIZED HEALTH CARE EDUCATION/TRAINING PROGRAM

## 2025-07-23 PROCEDURE — 93306 TTE W/DOPPLER COMPLETE: CPT

## 2025-07-23 RX ORDER — AMLODIPINE BESYLATE 5 MG/1
5 TABLET ORAL DAILY
Qty: 90 TABLET | Refills: 3 | Status: SHIPPED | OUTPATIENT
Start: 2025-07-23

## 2025-07-23 NOTE — TELEPHONE ENCOUNTER
----- Message from Candace Good MD sent at 7/23/2025  2:37 PM EDT -----  Please call the patient.  Echocardiogram shows normal ejection fraction with no significant valvular abnormalities.  ----- Message -----  From: Wendy Rodriguez MD  Sent: 7/23/2025   9:46 AM EDT  To: Candace Good MD

## 2025-07-23 NOTE — TELEPHONE ENCOUNTER
Pt's niece called back asking to go back over echo results.  Pt verbally confirmed it was ok to share results with pt's niece.  Deena states they were unable to get his new script for amlodipine today and I instructed her to have the pt take two 2.5mg pills of amlodipine this evening to make the new 5 mg dose.  Deena verbalized understanding and has no further questions at this time.

## 2025-07-23 NOTE — TELEPHONE ENCOUNTER
Patient increase the dosage of amlodipine to 5 mg p.o. daily.  I sent in new prescription for the same.

## 2025-07-23 NOTE — TELEPHONE ENCOUNTER
Patient called back with a question regarding the medication. Transfer to clinical was unsuccessful. Please call patient back at [phone]

## 2025-07-24 NOTE — TELEPHONE ENCOUNTER
Called patient and lvm with Dr MURILLO advismason patient is suppose to be taking amlodipine  5 mg daily      Patient used to take 2.5 mg medication was increased because of blood pressure reading